# Patient Record
Sex: FEMALE | Race: WHITE | Employment: UNEMPLOYED | URBAN - METROPOLITAN AREA
[De-identification: names, ages, dates, MRNs, and addresses within clinical notes are randomized per-mention and may not be internally consistent; named-entity substitution may affect disease eponyms.]

---

## 2017-01-21 PROBLEM — O26.899 ABDOMINAL PAIN AFFECTING PREGNANCY: Status: ACTIVE | Noted: 2017-01-21

## 2017-01-21 PROBLEM — Z34.90 PREGNANT: Status: ACTIVE | Noted: 2017-01-21

## 2017-01-21 PROBLEM — R10.9 ABDOMINAL PAIN AFFECTING PREGNANCY: Status: ACTIVE | Noted: 2017-01-21

## 2017-07-10 ENCOUNTER — HOSPITAL ENCOUNTER (OUTPATIENT)
Dept: PHYSICAL THERAPY | Age: 37
Discharge: HOME | End: 2017-07-10
Payer: COMMERCIAL

## 2017-07-10 PROCEDURE — 97110 THERAPEUTIC EXERCISES: CPT | Performed by: PHYSICAL MEDICINE & REHABILITATION

## 2017-07-10 PROCEDURE — 97530 THERAPEUTIC ACTIVITIES: CPT | Performed by: PHYSICAL MEDICINE & REHABILITATION

## 2017-07-10 PROCEDURE — 97162 PT EVAL MOD COMPLEX 30 MIN: CPT | Performed by: PHYSICAL MEDICINE & REHABILITATION

## 2017-07-10 PROCEDURE — 97140 MANUAL THERAPY 1/> REGIONS: CPT | Performed by: PHYSICAL MEDICINE & REHABILITATION

## 2017-07-10 NOTE — PROGRESS NOTES
PT INITIAL EVALUATION NOTE 2-15    Patient Name: Atiya San  Date:7/10/2017  : 1980  [x]  Patient  Verified  Payor: Gera Aaron / Plan: [employer] Leach HMO / Product Type: HMO /    In time: 10:30  Out time:11:30  Total Treatment Time (min): 60  Visit #: 1     Treatment Area: Right hip pain [M25.551]  Left hip pain [M25.552]    SUBJECTIVE  Pain Level (0-10 scale): 0/10   Any medication changes, allergies to medications, adverse drug reactions, diagnosis change, or new procedure performed?: [] No    [x] Yes (see summary sheet for update)  Subjective:     Patient is a 30year old female with complaints of coccyx pain that started in her 6th month of pregnancy, became more constant as pregnancy progressed. Initially she was intolerant of sitting cross legged. Pain has persisted 6 months post partum. Natural vaginal delivery in supine, with \"some tearing\". 2017    At her 6 week appointment she was still having some coccyx pain which has improved but not fully resolved. Agg factor:   Getting in/out of a car  Prolonged driving - more than 30 minutes  Unable to tolerate sitting on hard surface    She struggled with constipation during pregnancy and after. She has a bowel movement daily rated at a 2 to a 4 on the [facility] Stool Chart. She feels that she does have to strain to have a bowel movement. She has strong urgency with a full bladder  She leaks during exercise. She estimates she is urinating 5-6 during the day, she wakes 0-1 x per night  She is having pain with intercourse, avoidant   Stopped nursing 1 month ago    PLOF: no tailbone pain  Mechanism of Injury: insidious at 6 months pregnancy  Previous Treatment/Compliance: none  PMHx/Surgical Hx: breast reduction 10 years ago.   Work Hx: SAHM  Living Situation: lives with spouse and baby  Pt Goals: no pain with ADLs  Barriers: none  Motivation: good  Substance use: none reported  FABQ Score:   Cognition: A & O x 4 OBJECTIVE/EXAMINATION    Posture:  Reduced lumbar lordosis in standing, R IC higher in standing  Other Observations: visible difficulty sitting level on treatment table- shifted to R/L hip through interview   Gait and Functional Mobility: no gait compensation   Palpation: Tender at sacrococcygeal joint, tender L coccygeus, a-p pressure to coccyx reproduces cc pain. Joint Mobility: SIJ clearing tests (-)   Coccyx flexed, stiff. In supine L post rotated innominate, L LE short, L pub rami elevated         Lumbar AROM:        R  L  Flexion    Touches ankles, pain at sc joint at endrange           Extension   Stiff 50% ROM, no pain           Side Bending   Full           Rotation   Full              Aberrant movements:  Painful arc: [] Yes  [x] No  Instability catch: [] Yes  [x] No  Difficulty returning from flexion: [] Yes  [x] No  Reversal of lumbopelvic rhythm: [] Yes  [x] No      LOWER QUARTER   MUSCLE STRENGTH  KEY       R  L  0 - No Contraction  L1, L2 Psoas  5/5  5/5     1 - Trace   L3 Quads  5/5  5/5      2 - Poor   L4 Tib Ant  5/5  5/5      3 - Fair    L5 EHL  5/5  5/5      4 - Good   S1 FHL  5/5  5/5      5 - Normal   S2 Hams  5/5  5/5              MMT:  Core strength: 4/5   Hip abduction: 4/5   Hip extension: 4/5    Neurological: Sensation: Denies parasthesias, focal weakness    Special Tests:        Slump: NT   Jp: Tight B Hipflexors        Tangela: Tight B ITBs     SLR: mild cc Pain endrange passive SLR bilaterally   TERI: clear bilaterally       Long Sit: NT     SI compression/ Distraction: Clear   Active SLR: Clear    Rectal/Coccyx Exam:    Able to relax EAS for exam   AP pressure to coccyx reproduced cc pain   Coccyx flexed, fixed, stiff.     Attempted external traction/ext technique, unable to mobilize coccyx   Attempted internal AP grade 3 SC mobs into ext, unable to mobilize coccyx           15 min Therapeutic Exercise:  [x] See flow sheet :   Rationale: increase ROM, increase strength, improve coordination and increase proprioception to improve the patients ability to decrease coccyx pain with sitting    15 min Therapeutic Activity:  []  See flow sheet : coccyx protection techniques, bowel habit instruction   Rationale: improve coordination and decrease strain on pelvic floor muscles  to improve the patients ability to decrease coccyx pain with sitting    15 min Manual Therapy:  Coccyx mobs - external and internal   Rationale: decrease pain, increase ROM, increase tissue extensibility and decrease trigger points  to improve the patients ability to decrease coccyx pain with sitting    With   [x] TE   [] TA   [] neuro   [] other: Patient Education: [x] Review HEP    [] Progressed/Changed HEP based on:   [] positioning   [] body mechanics   [] transfers   [] heat/ice application    [x] other: Bowel habit information. Other Objective/Functional Measures: Tolerated internal mobs well however unable to move coccyx, no pain relief. Pain Level (0-10 scale) post treatment: 2/10      ASSESSMENT:   Patient presents with signs and symptoms consistent with sacrococcygeal joint dysfunction, coccyx area pain. Attempted external coccyx mobilization technique with no improvement. Attempted internal rectal coccyx mobilization technique with no improvement in coccyx mobility or decrease in pain, tolerated attempt well. We did not do a pelvic floor exam today however we discussed the indication for this and will proceed next visit as she tolerates. I suspect levator ani and coccygeus dysfunction with stiff SC joint exaccerbated in pregnancy. She was provided a HEP of stretches today, tolerated well. She will benefit from skilled PT services to address her limitations and achieve her functional goals as stated on the plan of care.        [x]  See Plan of Eliza Coffey, PT, MSPT    7/10/2017  10:30 AM

## 2017-07-12 NOTE — PROGRESS NOTES
[address] [place] [address] Alek Greenfield  Phone: [phone]  Fax: [phone]    Plan of Care/ Statement of Necessity for Physical Therapy Services 2-15    Patient name: Evelio Parker  : 1980  Provider#: [id number]  Referral source: Addison Russo MD      Medical/Treatment Diagnosis: Right hip pain [M25.551]  Left hip pain [M25.552]     Prior Hospitalization: see medical history     Comorbidities: postpartum 17, breast reduction surgery 10 years ago  Prior Level of Function: no coccyx pain   Medications: Verified on Patient Summary List    Start of Care: 7/10/17      Onset Date: 1 year ago       The Plan of Care and following information is based on the information from the initial evaluation. Assessment/ key information:   Patient is a 30year old female with complaints of coccyx pain that started in her 6th month of pregnancy, became more constant as pregnancy progressed. Initially she was intolerant of sitting cross legged. Pain has persisted 6 months post partum. Patient presents with signs and symptoms consistent with sacrococcygeal joint dysfunction, coccyx area pain. Attempted external coccyx mobilization technique with no improvement. Attempted internal rectal coccyx mobilization technique with no improvement in coccyx mobility or decrease in pain, tolerated attempt well. We did not do a pelvic floor exam today however we discussed the indication for this and will proceed next visit as she tolerates. I suspect levator ani and coccygeus dysfunction with stiff SC joint exaccerbated in pregnancy. She was provided a HEP of stretches today, tolerated well. She will benefit from skilled PT services to address her limitations and achieve her functional goals as stated on the plan of care.        Evaluation Complexity History MEDIUM  Complexity : 1-2 comorbidities / personal factors will impact the outcome/ POC ; Examination MEDIUM Complexity : 3 Standardized tests and measures addressing body structure, function, activity limitation and / or participation in recreation  ;Presentation MEDIUM Complexity : Evolving with changing characteristics  ; Clinical Decision Making MEDIUM Complexity : FOTO score of 26-74  Overall Complexity Rating: MEDIUM    Problem List: pain affecting function, decrease ROM, decrease strength, decrease ADL/ functional abilitiies, decrease activity tolerance, decrease flexibility/ joint mobility and decrease transfer abilities   Treatment Plan may include any combination of the following: Therapeutic exercise, Therapeutic activities, Neuromuscular re-education, Physical agent/modality, Gait/balance training, Manual therapy, Patient education, Self Care training and Functional mobility training  Patient / Family readiness to learn indicated by: asking questions  Persons(s) to be included in education: patient (P)  Barriers to Learning/Limitations: None  Patient Goal (s): no pain with sitting  Patient Self Reported Health Status: excellent  Rehabilitation Potential: excellent    Short Term Goals: To be accomplished in 6  weeks:  Patient will be independent with a progressive home exercise program  Patient will be independent with posture and body mechanics principles to reduce coccyx pain  Patient will be independent with bowel habit principles to reduce strain on pelvic floor musculature  Patient will have no tenderness to palpation to coccyx area    Long Term Goals: To be accomplished in 12 weeks:  Patient will have no pain sitting with support  Patient will demonstrate good pelvic floor ms strength at 4/5   Patient will have no pelvic floor ms trigger points   Patient will return to intercourse with no pain . Frequency / Duration: Patient to be seen 1-2 times per week for up to 12 weeks.     Patient/ Caregiver education and instruction: self care, activity modification and exercises    [x]  Plan of care has been reviewed with PTA Angelia Osgood, PT, MSPT     7/12/2017 2:04 PM    ________________________________________________________________________    I certify that the above Therapy Services are being furnished while the patient is under my care. I agree with the treatment plan and certify that this therapy is necessary.     [de-identified] Signature:____________________  Date:____________Time: _________

## 2017-07-17 ENCOUNTER — HOSPITAL ENCOUNTER (OUTPATIENT)
Dept: PHYSICAL THERAPY | Age: 37
Discharge: HOME | End: 2017-07-17
Payer: COMMERCIAL

## 2017-07-17 PROCEDURE — 97110 THERAPEUTIC EXERCISES: CPT | Performed by: PHYSICAL MEDICINE & REHABILITATION

## 2017-07-17 PROCEDURE — 97140 MANUAL THERAPY 1/> REGIONS: CPT | Performed by: PHYSICAL MEDICINE & REHABILITATION

## 2017-07-17 NOTE — PROGRESS NOTES
PT DAILY TREATMENT NOTE 2-15    Patient Name: Tesfaye Estrada  Date:2017  : 1980  [x]  Patient  Verified  Payor: Chapito Hilario / Plan: Danny Hou HMO / Product Type: HMO /    In time: 11:00  Out time: 12:00  Total Treatment Time (min): 60  Visit #: 2    Treatment Area: Right hip pain [M25.551]  Left hip pain [M25.552]    SUBJECTIVE  Pain Level (0-10 scale): Any medication changes, allergies to medications, adverse drug reactions, diagnosis change, or new procedure performed?: [x] No    [] Yes (see summary sheet for update)  Subjective functional status/changes:   [] No changes reported  No change in her coccyx pain. She was poorly tolerant of laying on her back to do her HEP, did not do much of her prescribed exercise due to this. In general having a lot of thoracic and lumbar stiffness 'I need to pop\"    OBJECTIVE    Elevated L pub rami in supine  Post L innominate in supine, R LE long  Tender through mid thoracic paraspinals L > R.   Stiff with PA pressures L T 5-7     45 min Therapeutic Exercise:  [] See flow sheet :   Rationale: increase ROM, increase strength, improve coordination and increase proprioception to improve the patients ability to perform ADLs with no increased coccyx pain    15 min Manual Therapy:  MET pub rami correction, MET L post innominate correction. Rib spring in prone. MET SIJ   Rationale: decrease pain, increase ROM, increase tissue extensibility and increase postural awareness  to improve the patients ability to perform ADLs and HEP with no increased pain     With   [x] TE   [] TA   [] neuro   [] other: Patient Education: [x] Review HEP    [] Progressed/Changed HEP based on:   [] positioning   [] body mechanics   [] transfers   [] heat/ice application    [x] other: Updated HEP in chart. Other Objective/Functional Measures: Improved thoracic mobility and pelvic alignment post treatment.        Pain Level (0-10 scale) post treatment: 0/10    ASSESSMENT/Changes in Function:  Not acutely painful today. She has not been doing HEP due to intolerance of supine positioning. Demonstrated good supine tolerance post-treatment today. Advised pt to continue HEP over the next 3 weeks while on vacation. If no improvement in coccyx pain will revisit pelvic floor musculature with a manual approach. Patient will continue to benefit from skilled PT services to modify and progress therapeutic interventions, address functional mobility deficits, address ROM deficits, address strength deficits, analyze and address soft tissue restrictions, analyze and cue movement patterns, analyze and modify body mechanics/ergonomics and assess and modify postural abnormalities to attain remaining goals. [x]  See Plan of Care  []  See progress note/recertification  []  See Discharge Summary         Progress towards goals / Updated goals:  Able to advance exercises today with good tolerance. Pt continues to need instruction for correct exercise form and performance. Continues to demonstrate good potential to achieve functional goals stated on the plan of care.       PLAN  [x]  Upgrade activities as tolerated     []  Continue plan of care  []  Update interventions per flow sheet       []  Discharge due to:_  []  Other:_      Jeremy Carlton PT, MSPT  7/17/2017  11:04 AM

## 2017-08-14 ENCOUNTER — APPOINTMENT (OUTPATIENT)
Dept: PHYSICAL THERAPY | Age: 37
End: 2017-08-14
Payer: COMMERCIAL

## 2017-08-16 ENCOUNTER — APPOINTMENT (OUTPATIENT)
Dept: PHYSICAL THERAPY | Age: 37
End: 2017-08-16
Payer: COMMERCIAL

## 2017-08-17 ENCOUNTER — HOSPITAL ENCOUNTER (OUTPATIENT)
Dept: PHYSICAL THERAPY | Age: 37
Discharge: HOME | End: 2017-08-17
Payer: COMMERCIAL

## 2017-08-17 PROCEDURE — 97530 THERAPEUTIC ACTIVITIES: CPT | Performed by: PHYSICAL MEDICINE & REHABILITATION

## 2017-08-17 PROCEDURE — 97140 MANUAL THERAPY 1/> REGIONS: CPT | Performed by: PHYSICAL MEDICINE & REHABILITATION

## 2017-08-17 PROCEDURE — 97110 THERAPEUTIC EXERCISES: CPT | Performed by: PHYSICAL MEDICINE & REHABILITATION

## 2017-08-17 NOTE — PROGRESS NOTES
PT DAILY TREATMENT NOTE 2-15    Patient Name: Efrain Blackwell  Date:2017  : 1980  [x]  Patient  Verified  Payor: Lenore Manzo / Plan: Damari Mortensen HMO / Product Type: HMO /    In time: 2:30   Out time: 3:30   Total Treatment Time (min): 60  Visit #: 3     Treatment Area: Right hip pain [M25.551]  Left hip pain [M25.552]    SUBJECTIVE  Pain Level (0-10 scale): 0/10  Any medication changes, allergies to medications, adverse drug reactions, diagnosis change, or new procedure performed?: [x] No    [] Yes (see summary sheet for update)  Subjective functional status/changes:   [] No changes reported  She fell down the stairs 17 landing directly on her bottom, pain worsened significantly since then and has persisted. She is less tolerant of sitting than previous. OBJECTIVE    15 min Therapeutic Exercise:  [x] See flow sheet :  HEP review   Rationale: increase ROM, increase strength, improve coordination and increase proprioception to improve the patients ability to increase sitting tolerance. 15 min Therapeutic Activity:  [x]  See flow sheet : Coccyx relief techniques, sitting postures, coccyx pillow   Rationale: increase ROM, increase strength and decrease pain   to improve the patients ability to increase sitting tolerance    30 min Manual Therapy:  Sacral pressure, STM to sacral ligaments. Rationale: increase ROM, increase tissue extensibility and decrease trigger points  to improve the patients ability to increase sitting tolerance          With   [x] TE   [x] TA   [] neuro   [] other: Patient Education: [x] Review HEP    [] Progressed/Changed HEP based on:   [] positioning   [] body mechanics   [] transfers   [] heat/ice application    [] other:      Other Objective/Functional Measures: Tender at [facility] joint     Pain Level (0-10 scale) post treatment: 3/10    ASSESSMENT/Changes in Function:   Increase in coxxyx pain after recent fall.      Patient will continue to benefit from skilled PT services to modify and progress therapeutic interventions, address functional mobility deficits, address ROM deficits, address strength deficits, analyze and address soft tissue restrictions, analyze and cue movement patterns, analyze and modify body mechanics/ergonomics and assess and modify postural abnormalities to attain remaining goals. [x]  See Plan of Care  []  See progress note/recertification  []  See Discharge Summary         Progress towards goals / Updated goals:  Able to advance exercises today with good tolerance. Pt continues to need instruction for correct exercise form and performance. Continues to demonstrate good potential to achieve functional goals stated on the plan of care.       PLAN  [x]  Upgrade activities as tolerated     []  Continue plan of care  []  Update interventions per flow sheet       []  Discharge due to:_  []  Other:_      Gaylin Sacks, PT, MSPT 8/17/2017  2:33 PM

## 2017-09-07 ENCOUNTER — APPOINTMENT (OUTPATIENT)
Dept: PHYSICAL THERAPY | Age: 37
End: 2017-09-07

## 2017-09-13 ENCOUNTER — APPOINTMENT (OUTPATIENT)
Dept: PHYSICAL THERAPY | Age: 37
End: 2017-09-13

## 2017-09-27 LAB
CHLAMYDIA, EXTERNAL: NEGATIVE
HBSAG, EXTERNAL: NEGATIVE
HIV, EXTERNAL: NON REACTIVE
N. GONORRHEA, EXTERNAL: NEGATIVE
RUBELLA, EXTERNAL: NORMAL

## 2017-10-25 NOTE — PROGRESS NOTES
[address] [facility] [place] 59  Phone: [phone]  Fax: [phone]    Discharge Summary  2-15    Patient name: Sherita Kurtz  : 1980  Provider#: [id number]  Referral source: Manuel Naqvi MD      Medical/Treatment Diagnosis: Right hip pain [M25.551]  Left hip pain [M25.552]     Prior Hospitalization: see medical history     Comorbidities: See Plan of Care  Prior Level of Function:See Plan of Care  Medications: Verified on Patient Summary List    Start of Care: 7/10/17      Onset Date: during pregnancy    Visits from Start of Care: 3     Missed Visits: 1  Reporting Period : 7/10/17 to 10/25/17      ASSESSMENT/SUMMARY OF CARE:  Judy Alexander was referred for evaluation and treatment of coccyx pain and dyspareunia post-partum. She was seen for 3 visits. Treatment consisted of manual therapy, therapeutic exercise, therapeutic activity, bowel habit education, home exercise program development and progression. Initially Mrs Fatuma Daily demonstrated a good response to PT, especially stretches and manual therapy. Unfortunately she suffered a fall which exacerbated her symptoms 17. She has not been seen after her third visit, attempts to contact the patient have been unsuccessful. She is discharged with current functional status unknown. Short Term Goals: To be accomplished in 6  weeks:  Patient will be independent with a progressive home exercise program   MET  Patient will be independent with posture and body mechanics principles to reduce coccyx pain MET  Patient will be independent with bowel habit principles to reduce strain on pelvic floor musculature MET  Patient will have no tenderness to palpation to coccyx area     Long Term Goals:  To be accomplished in 12 weeks:  Patient will have no pain sitting with support  Patient will demonstrate good pelvic floor ms strength at 4/5   Patient will have no pelvic floor ms trigger points Patient will return to intercourse with no pain .      RECOMMENDATIONS:  [x]Discontinue therapy: []Patient has reached or is progressing toward set goals      [x]Patient is non-compliant or has abdicated      []Due to lack of appreciable progress towards set goals      []Other    Gloria Miller PT, MSPT    10/25/2017 10:12 AM

## 2018-02-16 LAB
ANTIBODY SCREEN, EXTERNAL: NEGATIVE
T. PALLIDUM, EXTERNAL: NEGATIVE

## 2018-04-13 LAB — GRBS, EXTERNAL: POSITIVE

## 2018-05-06 ENCOUNTER — HOSPITAL ENCOUNTER (INPATIENT)
Age: 38
LOS: 2 days | Discharge: HOME | End: 2018-05-08
Attending: OBSTETRICS & GYNECOLOGY | Admitting: OBSTETRICS & GYNECOLOGY
Payer: COMMERCIAL

## 2018-05-06 LAB
BASOPHILS # BLD: 0 K/UL (ref 0–0.1)
BASOPHILS NFR BLD: 0 % (ref 0–1)
DIFFERENTIAL METHOD BLD: ABNORMAL
EOSINOPHIL # BLD: 0.1 K/UL (ref 0–0.4)
EOSINOPHIL NFR BLD: 0 % (ref 0–7)
ERYTHROCYTE [DISTWIDTH] IN BLOOD BY AUTOMATED COUNT: 14.7 % (ref 11.5–14.5)
HCT VFR BLD AUTO: 33.5 % (ref 35–47)
HGB BLD-MCNC: 11.3 G/DL (ref 11.5–16)
IMM GRANULOCYTES # BLD: 0.1 K/UL (ref 0–0.04)
IMM GRANULOCYTES NFR BLD AUTO: 1 % (ref 0–0.5)
LYMPHOCYTES # BLD: 2.2 K/UL (ref 0.8–3.5)
LYMPHOCYTES NFR BLD: 19 % (ref 12–49)
MCH RBC QN AUTO: 28.7 PG (ref 26–34)
MCHC RBC AUTO-ENTMCNC: 33.7 G/DL (ref 30–36.5)
MCV RBC AUTO: 85 FL (ref 80–99)
MONOCYTES # BLD: 0.8 K/UL (ref 0–1)
MONOCYTES NFR BLD: 7 % (ref 5–13)
NEUTS SEG # BLD: 8.4 K/UL (ref 1.8–8)
NEUTS SEG NFR BLD: 73 % (ref 32–75)
NRBC # BLD: 0 K/UL (ref 0–0.01)
NRBC BLD-RTO: 0 PER 100 WBC
PLATELET # BLD AUTO: 236 K/UL (ref 150–400)
PMV BLD AUTO: 10.3 FL (ref 8.9–12.9)
RBC # BLD AUTO: 3.94 M/UL (ref 3.8–5.2)
WBC # BLD AUTO: 11.6 K/UL (ref 3.6–11)

## 2018-05-06 PROCEDURE — 99283 EMERGENCY DEPT VISIT LOW MDM: CPT

## 2018-05-06 PROCEDURE — 74011250637 HC RX REV CODE- 250/637

## 2018-05-06 PROCEDURE — 85025 COMPLETE CBC W/AUTO DIFF WBC: CPT | Performed by: OBSTETRICS & GYNECOLOGY

## 2018-05-06 PROCEDURE — 75410000003 HC RECOV DEL/VAG/CSECN EA 0.5 HR: Performed by: OBSTETRICS & GYNECOLOGY

## 2018-05-06 PROCEDURE — 0HQ9XZZ REPAIR PERINEUM SKIN, EXTERNAL APPROACH: ICD-10-PCS | Performed by: OBSTETRICS & GYNECOLOGY

## 2018-05-06 PROCEDURE — 77030034850

## 2018-05-06 PROCEDURE — 75410000002 HC LABOR FEE PER 1 HR: Performed by: OBSTETRICS & GYNECOLOGY

## 2018-05-06 PROCEDURE — 65270000029 HC RM PRIVATE

## 2018-05-06 PROCEDURE — 74011250636 HC RX REV CODE- 250/636

## 2018-05-06 PROCEDURE — 74011000250 HC RX REV CODE- 250

## 2018-05-06 PROCEDURE — 75410000000 HC DELIVERY VAGINAL/SINGLE: Performed by: OBSTETRICS & GYNECOLOGY

## 2018-05-06 PROCEDURE — 4A1H74Z MONITORING OF PRODUCTS OF CONCEPTION, CARDIAC ELECTRICAL ACTIVITY, VIA NATURAL OR ARTIFICIAL OPENING: ICD-10-PCS | Performed by: OBSTETRICS & GYNECOLOGY

## 2018-05-06 PROCEDURE — 77030011943

## 2018-05-06 PROCEDURE — 74011250637 HC RX REV CODE- 250/637: Performed by: OBSTETRICS & GYNECOLOGY

## 2018-05-06 PROCEDURE — 77030021125

## 2018-05-06 PROCEDURE — 74011250636 HC RX REV CODE- 250/636: Performed by: OBSTETRICS & GYNECOLOGY

## 2018-05-06 PROCEDURE — 59025 FETAL NON-STRESS TEST: CPT

## 2018-05-06 PROCEDURE — 36415 COLL VENOUS BLD VENIPUNCTURE: CPT | Performed by: OBSTETRICS & GYNECOLOGY

## 2018-05-06 RX ORDER — HYDROMORPHONE HYDROCHLORIDE 2 MG/ML
1 INJECTION, SOLUTION INTRAMUSCULAR; INTRAVENOUS; SUBCUTANEOUS
Status: COMPLETED | OUTPATIENT
Start: 2018-05-06 | End: 2018-05-06

## 2018-05-06 RX ORDER — OXYTOCIN IN 5 % DEXTROSE 30/500 ML
.5-2 PLASTIC BAG, INJECTION (ML) INTRAVENOUS
Status: DISCONTINUED | OUTPATIENT
Start: 2018-05-06 | End: 2018-05-06

## 2018-05-06 RX ORDER — MISOPROSTOL 200 UG/1
TABLET ORAL
Status: COMPLETED
Start: 2018-05-06 | End: 2018-05-06

## 2018-05-06 RX ORDER — CEFAZOLIN SODIUM/WATER 2 G/20 ML
2 SYRINGE (ML) INTRAVENOUS
Status: COMPLETED | OUTPATIENT
Start: 2018-05-06 | End: 2018-05-06

## 2018-05-06 RX ORDER — NALOXONE HYDROCHLORIDE 0.4 MG/ML
0.4 INJECTION, SOLUTION INTRAMUSCULAR; INTRAVENOUS; SUBCUTANEOUS AS NEEDED
Status: DISCONTINUED | OUTPATIENT
Start: 2018-05-06 | End: 2018-05-08 | Stop reason: HOSPADM

## 2018-05-06 RX ORDER — SWAB
1 SWAB, NON-MEDICATED MISCELLANEOUS DAILY
Status: DISCONTINUED | OUTPATIENT
Start: 2018-05-06 | End: 2018-05-08 | Stop reason: HOSPADM

## 2018-05-06 RX ORDER — METHYLERGONOVINE MALEATE 0.2 MG/ML
INJECTION INTRAVENOUS
Status: COMPLETED
Start: 2018-05-06 | End: 2018-05-06

## 2018-05-06 RX ORDER — LIDOCAINE HYDROCHLORIDE 10 MG/ML
INJECTION, SOLUTION EPIDURAL; INFILTRATION; INTRACAUDAL; PERINEURAL
Status: COMPLETED
Start: 2018-05-06 | End: 2018-05-06

## 2018-05-06 RX ORDER — IBUPROFEN 800 MG/1
800 TABLET ORAL EVERY 8 HOURS
Status: DISCONTINUED | OUTPATIENT
Start: 2018-05-06 | End: 2018-05-08 | Stop reason: HOSPADM

## 2018-05-06 RX ORDER — SODIUM CHLORIDE 0.9 % (FLUSH) 0.9 %
5-10 SYRINGE (ML) INJECTION EVERY 8 HOURS
Status: DISCONTINUED | OUTPATIENT
Start: 2018-05-06 | End: 2018-05-06

## 2018-05-06 RX ORDER — SODIUM CHLORIDE, SODIUM LACTATE, POTASSIUM CHLORIDE, CALCIUM CHLORIDE 600; 310; 30; 20 MG/100ML; MG/100ML; MG/100ML; MG/100ML
125 INJECTION, SOLUTION INTRAVENOUS CONTINUOUS
Status: DISCONTINUED | OUTPATIENT
Start: 2018-05-06 | End: 2018-05-06

## 2018-05-06 RX ORDER — METHYLERGONOVINE MALEATE 0.2 MG/ML
0.2 INJECTION INTRAVENOUS ONCE
Status: COMPLETED | OUTPATIENT
Start: 2018-05-06 | End: 2018-05-06

## 2018-05-06 RX ORDER — HYDROCORTISONE ACETATE PRAMOXINE HCL 2.5; 1 G/100G; G/100G
CREAM TOPICAL AS NEEDED
Status: DISCONTINUED | OUTPATIENT
Start: 2018-05-06 | End: 2018-05-08 | Stop reason: HOSPADM

## 2018-05-06 RX ORDER — MISOPROSTOL 200 UG/1
800 TABLET ORAL
Status: COMPLETED | OUTPATIENT
Start: 2018-05-06 | End: 2018-05-06

## 2018-05-06 RX ORDER — HYDROCODONE BITARTRATE AND ACETAMINOPHEN 7.5; 325 MG/1; MG/1
1 TABLET ORAL
Status: DISCONTINUED | OUTPATIENT
Start: 2018-05-06 | End: 2018-05-08 | Stop reason: HOSPADM

## 2018-05-06 RX ORDER — OXYTOCIN IN 5 % DEXTROSE 30/500 ML
PLASTIC BAG, INJECTION (ML) INTRAVENOUS
Status: COMPLETED
Start: 2018-05-06 | End: 2018-05-06

## 2018-05-06 RX ORDER — NALOXONE HYDROCHLORIDE 0.4 MG/ML
0.4 INJECTION, SOLUTION INTRAMUSCULAR; INTRAVENOUS; SUBCUTANEOUS AS NEEDED
Status: DISCONTINUED | OUTPATIENT
Start: 2018-05-06 | End: 2018-05-06

## 2018-05-06 RX ORDER — OXYTOCIN/RINGER'S LACTATE 20/1000 ML
125-500 PLASTIC BAG, INJECTION (ML) INTRAVENOUS ONCE
Status: COMPLETED | OUTPATIENT
Start: 2018-05-06 | End: 2018-05-06

## 2018-05-06 RX ORDER — SODIUM CHLORIDE 0.9 % (FLUSH) 0.9 %
5-10 SYRINGE (ML) INJECTION AS NEEDED
Status: DISCONTINUED | OUTPATIENT
Start: 2018-05-06 | End: 2018-05-06

## 2018-05-06 RX ORDER — ONDANSETRON 2 MG/ML
4 INJECTION INTRAMUSCULAR; INTRAVENOUS
Status: DISCONTINUED | OUTPATIENT
Start: 2018-05-06 | End: 2018-05-08 | Stop reason: HOSPADM

## 2018-05-06 RX ADMIN — IBUPROFEN 800 MG: 800 TABLET ORAL at 08:22

## 2018-05-06 RX ADMIN — LIDOCAINE HYDROCHLORIDE 5 ML: 10 INJECTION, SOLUTION EPIDURAL; INFILTRATION; INTRACAUDAL; PERINEURAL at 01:01

## 2018-05-06 RX ADMIN — Medication 5000 MILLI-UNITS/HR: at 03:35

## 2018-05-06 RX ADMIN — ONDANSETRON 4 MG: 2 INJECTION INTRAMUSCULAR; INTRAVENOUS at 03:18

## 2018-05-06 RX ADMIN — Medication 10000 MILLI-UNITS/HR: at 03:20

## 2018-05-06 RX ADMIN — METHYLERGONOVINE MALEATE 0.2 MG: 0.2 INJECTION, SOLUTION INTRAMUSCULAR; INTRAVENOUS at 02:30

## 2018-05-06 RX ADMIN — SODIUM CHLORIDE, SODIUM LACTATE, POTASSIUM CHLORIDE, AND CALCIUM CHLORIDE 1000 ML: 600; 310; 30; 20 INJECTION, SOLUTION INTRAVENOUS at 00:39

## 2018-05-06 RX ADMIN — Medication 10 ML: at 02:56

## 2018-05-06 RX ADMIN — Medication 999 MILLI-UNITS/MIN: at 02:28

## 2018-05-06 RX ADMIN — Medication 999 MILLI-UNITS/MIN: at 01:00

## 2018-05-06 RX ADMIN — MISOPROSTOL 800 MCG: 200 TABLET ORAL at 02:50

## 2018-05-06 RX ADMIN — IBUPROFEN 800 MG: 800 TABLET ORAL at 16:20

## 2018-05-06 RX ADMIN — HYDROMORPHONE HYDROCHLORIDE 1 MG: 2 INJECTION INTRAMUSCULAR; INTRAVENOUS; SUBCUTANEOUS at 02:49

## 2018-05-06 RX ADMIN — MUPIROCIN: 20 OINTMENT TOPICAL at 08:22

## 2018-05-06 RX ADMIN — Medication 1 TABLET: at 08:22

## 2018-05-06 RX ADMIN — Medication 2 G: at 02:52

## 2018-05-06 RX ADMIN — Medication 19980 MILLI-UNITS/HR: at 02:32

## 2018-05-06 NOTE — PROGRESS NOTES
Patient: Laura Abraham  MRN: [id number]   Delivery Note    Obstetrician:  Thad Decker MD    Pre-Delivery Diagnosis:   <principal problem not specified>    Delivery:          Amniotic Fluid Volume : Moderate thin meconium     Delivery Complications:  None    Infant Details:  Information for the patient's :  [place], Male [[id number]]   One Minute Apgar: 6 (Filed from Delivery Summary)  Five  Minute Apgar: 9 (Filed from Delivery Summary)      No results found for: APH, APCO2, APO2, AHCO3, ABEC, ABDC, O2ST, SITE, RSCOM     Placenta:Spontaneous, intact        Episiotomy/Laceration(s):1st degree laceration            Episiotomy/Laceration(s) Repair: Yes, repaired with 2-0 Vicryl.     Group Beta Strep:   Lab Results   Component Value Date/Time    [place], External Negative 2016               Signed By:  Thad Decker MD     May 6, 2018

## 2018-05-06 NOTE — PROGRESS NOTES
SBAR IN Report: Mother    Verbal report received from Regino 274 (full name & credentials) on this patient, who is now being transferred from Labor and Delivery Unit (unit) for routine progression of care. The patient is not wearing a green \"Anesthesia-Duramorph\" band. Report consisted of patient's Situation, Background, Assessment and Recommendations (SBAR).  ID bands were compared with the identification form, and verified with the patient and transferring nurse. Information from the SBAR and [facility] and the [place] Report was reviewed with the transferring nurse; opportunity for questions and clarification provided.

## 2018-05-06 NOTE — IP AVS SNAPSHOT
[address] 1007 [facility] 
[phone] Patient: Bhupinder Lao MRN: [id number] BVT:[id number] About your hospitalization You were admitted on:  May 6, 2018 You last received care in the:  1305 [facility] 3 [facility] You were discharged on: May 8, 2018 Why you were hospitalized Your primary diagnosis was:  Not on File Your diagnoses also included:  Labor, Precipitous, Acute Superficial Venous Thrombosis Of Left Lower Extremity Follow-up Information Follow up With Details Comments Contact Info MD [address] 1007 [facility] 
[phone] Discharge Orders None A check dara indicates which time of day the medication should be taken. My Medications START taking these medications Instructions Each Dose to Equal  
 Morning Noon Evening Bedtime HYDROcodone-acetaminophen 7.5-325 mg per tablet Commonly known as:  [address] Your last dose was: Your next dose is: Take 1 Tab by mouth every six (6) hours as needed. Max Daily Amount: 4 Tabs. 1 Tab CONTINUE taking these medications Instructions Each Dose to Equal  
 Morning Noon Evening Bedtime CLARITIN 10 mg tablet Generic drug:  loratadine Your last dose was: Your next dose is: Take 10 mg by mouth. 10 mg  
    
   
   
   
  
 ibuprofen 800 mg tablet Commonly known as:  MOTRIN Your last dose was: Your next dose is: Take 1 Tab by mouth every eight (8) hours as needed for Pain. 800 mg  
    
   
   
   
  
 prenatal multivit-ca-min-fe-fa Tab Your last dose was: Your next dose is: Take  by mouth daily. STOP taking these medications   
 clindamycin 150 mg capsule Commonly known as:  CLEOCIN  
   
  
 MAGNESIUM PO  
   
  
 oxyCODONE-acetaminophen 5-325 mg per tablet Commonly known as:  PERCOCET  
   
  
 ROXICODONE 5 mg immediate release tablet Generic drug:  oxyCODONE IR  
   
  
 ZINC PO Where to Get Your Medications Information on where to get these meds will be given to you by the nurse or doctor. ! Ask your nurse or doctor about these medications HYDROcodone-acetaminophen 7.5-325 mg per tablet  
 ibuprofen 800 mg tablet Opioid Education Prescription Opioids: What You Need to Know: 
 
Prescription opioids can be used to help relieve moderate-to-severe pain and are often prescribed following a surgery or injury, or for certain health conditions. These medications can be an important part of treatment but also come with serious risks. Opioids are strong pain medicines. Examples include hydrocodone, oxycodone, fentanyl, and morphine. Heroin is an example of an illegal opioid. It is important to work with your health care provider to make sure you are getting the safest, most effective care. WHAT ARE THE RISKS AND SIDE EFFECTS OF OPIOID USE? Prescription opioids carry serious risks of addiction and overdose, especially with prolonged use. An opioid overdose, often marked by slow breathing, can cause sudden death. The use of prescription opioids can have a number of side effects as well, even when taken as directed. · Tolerance-meaning you might need to take more of a medication for the same pain relief · Physical dependence-meaning you have symptoms of withdrawal when the medication is stopped. Withdrawal symptoms can include nausea, sweating, chills, diarrhea, stomach cramps, and muscle aches. Withdrawal can last up to several weeks, depending on which drug you took and how long you took it. · Increased sensitivity to pain · Constipation · Nausea, vomiting, and dry mouth · Sleepiness and dizziness · Confusion · Depression · Low levels of testosterone that can result in lower sex drive, energy, and strength · Itching and sweating RISKS ARE GREATER WITH:      
· History of drug misuse, substance use disorder, or overdose · Mental health conditions (such as depression or anxiety) · Sleep apnea · Older age (72 years or older) · Pregnancy Avoid alcohol while taking prescription opioids. Also, unless specifically advised by your health care provider, medications to avoid include: · Benzodiazepines (such as Xanax or Valium) · Muscle relaxants (such as Soma or Flexeril) · Hypnotics (such as Ambien or Lunesta) · Other prescription opioids KNOW YOUR OPTIONS Talk to your health care provider about ways to manage your pain that don't involve prescription opioids. Some of these options may actually work better and have fewer risks and side effects. Options may include: 
· Pain relievers such as acetaminophen, ibuprofen, and naproxen · Some medications that are also used for depression or seizures · Physical therapy and exercise · Counseling to help patients learn how to cope better with triggers of pain and stress. · Application of heat or cold compress · Massage therapy · Relaxation techniques Be Informed Make sure you know the name of your medication, how much and how often to take it, and its potential risks & side effects. IF YOU ARE PRESCRIBED OPIOIDS FOR PAIN: 
· Never take opioids in greater amounts or more often than prescribed. Remember the goal is not to be pain-free but to manage your pain at a tolerable level. · Follow up with your primary care provider to: · Work together to create a plan on how to manage your pain. · Talk about ways to help manage your pain that don't involve prescription opioids. · Talk about any and all concerns and side effects. · Help prevent misuse and abuse. · Never sell or share prescription opioids · Help prevent misuse and abuse. · Store prescription opioids in a secure place and out of reach of others (this may include visitors, children, friends, and family). · Safely dispose of unused/unwanted prescription opioids: Find your community drug take-back program or your pharmacy mail-back program, or flush them down the toilet, following guidance from the Food and Drug Administration (www.fda.gov/Drugs/ResourcesForYou). · Visit www.cdc.gov/drugoverdose to learn about the risks of opioid abuse and overdose. · If you believe you may be struggling with addiction, tell your health care provider and ask for guidance or call [employer] at 4-189-664-NLQB. Discharge Instructions Vaginal Childbirth: After Your Visit Your Care Instructions Your body will slowly heal in the next few weeks. It is easy to get too tired and overwhelmed during the first weeks after your baby is born. Changes in your hormones can shift your mood without warning. You may find it hard to meet the extra demands on your energy and time. Take it easy on yourself. Follow-up care is a key part of your treatment and safety. Be sure to make and go to all appointments, and call your doctor if you are having problems. It's also a good idea to know your test results and keep a list of the medicines you take. How can you care for yourself at home? · Vaginal bleeding and cramps ¨ After delivery, you will have a bloody discharge from the vagina. This will turn pink within a week and then white or yellow after about 10 days. It may last for 2 to 4 weeks or longer, until the uterus has healed. Use pads instead of tampons until you stop bleeding. ¨ Do not worry if you pass some blood clots, as long as they are smaller than a golf ball. If you have a tear or stitches in your vaginal area, change the pad at least every 4 hours to prevent soreness and infection. ¨ You may have cramps for the first few days after childbirth. These are normal and occur as the uterus shrinks to normal size. Take an over-the-counter pain medicine, such as acetaminophen (Tylenol), ibuprofen (Advil, Motrin), or naproxen (Aleve), for cramps. Read and follow all instructions on the label. Do not take aspirin, because it can cause more bleeding. ¨ Do not take two or more pain medicines at the same time unless the doctor told you to. Many pain medicines have acetaminophen, which is Tylenol. Too much acetaminophen (Tylenol) can be harmful. · Stitches ¨ If you have stitches, they will dissolve on their own and do not need to be removed. Follow your doctor's instructions for cleaning the stitched area. ¨ Put ice or a cold pack on your painful area for 10 to 20 minutes at a time, several times a day, for the first few days. Put a thin cloth between the ice and your skin. ¨ Sit in a few inches of warm water (sitz bath) 3 times a day and after bowel movements. The warm water helps with pain and itching. If you do not have a tub, a warm shower might help. · Breast fullness ¨ Your breasts may overfill (engorge) in the first few days after delivery. To help milk flow and to relieve pain, warm your breasts in the shower or by using warm, moist towels before nursing. ¨ If you are not nursing, do not put warmth on your breasts or touch your breasts. Wear a tight bra or sports bra and use ice until the fullness goes away. This usually takes 2 to 3 days. ¨ Put ice or a cold pack on your breast after nursing to reduce swelling and pain. Put a thin cloth between the ice and your skin. · Activity ¨ Eat a balanced diet. Do not try to lose weight by cutting calories. Keep taking your prenatal vitamins, or take a multivitamin. ¨ Get as much rest as you can. Try to take naps when your baby sleeps during the day. ¨ Get some exercise every day.  But do not do any heavy exercise until your doctor says it is okay. ¨ Wait until you are healed (about 4 to 6 weeks) before you have sexual intercourse. Your doctor will tell you when it is okay to have sex. ¨ Talk to your doctor about birth control. You can get pregnant even before your period returns. Also, you can get pregnant while you are breast-feeding. · Mental health ¨ It is normal to have some sadness, anxiety, sleeplessness, and mood swings after you go home. If you feel upset or hopeless for more than a few days or are having trouble doing the things you need to do, talk to your doctor. · Constipation and hemorrhoids ¨ Drink plenty of fluids, enough so that your urine is light yellow or clear like water. If you have kidney, heart, or liver disease and have to limit fluids, talk with your doctor before you increase the amount of fluids you drink. ¨ Eat plenty of fiber each day. Have a bran muffin or bran cereal for breakfast, and try eating a piece of fruit for a mid-afternoon snack. ¨ For painful, itchy hemorrhoids, put ice or a cold pack on the area several times a day for 10 minutes at a time. Follow this by putting a warm compress on the area for another 10 to 20 minutes or by sitting in a shallow, warm bath. When should you call for help? Call 911 anytime you think you may need emergency care. For example, call if: 
· You are thinking of hurting yourself, your baby, or anyone else. · You have sudden, severe pain in your belly. · You passed out (lost consciousness). Call your doctor now or seek immediate medical care if: 
· You have severe vaginal bleeding. · You are soaking through a pad each hour for 2 or more hours. · Your vaginal bleeding seems to be getting heavier or is still bright red 4 days after delivery. · You are dizzy or lightheaded, or you feel like you may faint. · You are vomiting or cannot keep fluids down. · You have a fever. · You have new or more belly pain. · You pass tissue (not just blood). · Your vaginal discharge smells bad. · Your belly feels tender or full and hard. · Your breasts are continuously painful or red. · You feel sad, anxious, or hopeless for more than a few days. Watch closely for changes in your health, and be sure to contact your doctor if you have any problems. Where can you learn more? Go to LogicBay.be Enter J424 in the search box to learn more about \"Vaginal Childbirth: After Your Visit. \"  
© 1863-4532 Healthwise, Incorporated. Care instructions adapted under license by [employer] (which disclaims liability or warranty for this information). This care instruction is for use with your licensed healthcare professional. If you have questions about a medical condition or this instruction, always ask your healthcare professional. [address] any warranty or liability for your use of this information. Content Version: 51.4.102231; Current as of: February 20, 2015 Augure Announcement We are excited to announce that we are making your provider's discharge notes available to you in Augure. You will see these notes when they are completed and signed by the physician that discharged you from your recent hospital stay. If you have any questions or concerns about any information you see in Augure, please call the Health Information Department where you were seen or reach out to your Primary Care Provider for more information about your plan of care. Introducing [facility]! [facility] [employer] [facility] introduces Augure patient portal. Now you can access parts of your medical record, email your doctor's office, and request medication refills online. 1. In your internet browser, go to [email or web address] 2. Click on the First Time User? Click Here link in the Sign In box. You will see the New Member Sign Up page. 3. Enter your Augure Access Code exactly as it appears below.  You will not need to use this code after youve completed the sign-up process. If you do not sign up before the expiration date, you must request a new code. · Nutzvieh24 Access Code: [email or web address] Expires: 7/3/2018 12:13 PM 
 
4. Enter the last four digits of your Social Security Number (xxxx) and Date of Birth (mm/dd/yyyy) as indicated and click Submit. You will be taken to the next sign-up page. 5. Create a Nutzvieh24 ID. This will be your Nutzvieh24 login ID and cannot be changed, so think of one that is secure and easy to remember. 6. Create a Nutzvieh24 password. You can change your password at any time. 7. Enter your Password Reset Question and Answer. This can be used at a later time if you forget your password. 8. Enter your e-mail address. You will receive e-mail notification when new information is available in [address] 9. Click Sign Up. You can now view and download portions of your medical record. 10. Click the Download Summary menu link to download a portable copy of your medical information. If you have questions, please visit the Frequently Asked Questions section of the Nutzvieh24 website. Remember, Nutzvieh24 is NOT to be used for urgent needs. For medical emergencies, dial 911. Now available from your iPhone and Android! Introducing Pro Linares As a [facility] patient, I wanted to make you aware of our electronic visit tool called Pro Linares. [facility] 24/7 allows you to connect within minutes with a medical provider 24 hours a day, seven days a week via a mobile device or tablet or logging into a secure website from your computer. You can access Pro Mathewsfin from anywhere in the [place].  
 
A virtual visit might be right for you when you have a simple condition and feel like you just dont want to get out of bed, or cant get away from work for an appointment, when your regular [facility] provider is not available (evenings, weekends or holidays), or when youre out of town and need minor care. Electronic visits cost only $49 and if the [facility] 24/[employer] provider determines a prescription is needed to treat your condition, one can be electronically transmitted to a nearby pharmacy*. Please take a moment to enroll today if you have not already done so. The enrollment process is free and takes just a few minutes. To enroll, please download the [employer] Infinity aidee to your tablet or phone, or visit [email or web address] to enroll on your computer. And, as an [address] [place] [address] patient with a ZEB account, the results of your visits will be scanned into your electronic medical record and your primary care provider will be able to view the scanned results. We urge you to continue to see your regular [facility] provider for your ongoing medical care. And while your primary care provider may not be the one available when you seek a Hotlist virtual visit, the peace of mind you get from getting a real diagnosis real time can be priceless. For more information on Hotlist, view our Frequently Asked Questions (FAQs) at [email or web address]. Sincerely, 
 
Marko Barrios MD 
Chief Medical Officer [facility] Caitlyn Rinaldi *:  certain medications cannot be prescribed via Hotlist Providers Seen During Your Hospitalization Provider Specialty Primary office phone Nicola Farr MD Obstetrics & Gynecology [phone] Your Primary Care Physician (PCP) Primary Care Physician Office Phone Office Fax Tony Bhatia [phone] You are allergic to the following Allergen Reactions Shellfish Derived Hives Shrimp only Recent Documentation Height Weight Breastfeeding? BMI OB Status Smoking Status 1.753 m 97.7 kg Unknown 31.81 kg/m2 Recent pregnancy Former Smoker Emergency Contacts Name Discharge Info Relation Home Work Mobile Levar Ro DISCHARGE CAREGIVER [3] Spouse [3]   [phone] GarlandJareda DISCHARGE CAREGIVER [3] Mother [14]   [phone] Patient Belongings The following personal items are in your possession at time of discharge: 
  Dental Appliances: None  Visual Aid: At bedside, Contacts, With patient      Home Medications: None   Jewelry: None  Clothing: At bedside, Shirt, With patient, Footwear, Undergarments, Pants, Socks    Other Valuables: At bedside, Cell Phone, Pedro Balm, Personal electronic devices (comment), Purse, Wallet, Personal toiletries, Contact Lenses, Suitcase, With patient  Personal Items Sent to Safe: none Please provide this summary of care documentation to your next provider. Signatures-by signing, you are acknowledging that this After Visit Summary has been reviewed with you and you have received a copy. Patient Signature:  ____________________________________________________________ Date:  ____________________________________________________________  
  
Valentine Donahue Provider Signature:  ____________________________________________________________ Date:  ____________________________________________________________

## 2018-05-06 NOTE — PROGRESS NOTES
0025: Pt arrives to L&D unit via wheelchair accompanied by FOB visibly in labor. Pt is admitted to room 206, pt standing at the side of the bed swaying & groaning with ctx. Nitrous oxide is brought into the room and set up. IV access is initiated, LR fluid bolus initiated. Between ctx, pt able to get in bed for cervical exam. As pt gets into bed, SROM occurs; moderate amount of thick meconium at 0026. Cervical exam performed, pt complete and +2 at this time. Call for MD and nursery placed. 3710: MD arrives at bedside for delivery. 1055: Pt begins pushing in the lithotomy position with legs in stirrups. With coaching, pt pushes adequately. 0531: Delivery of viable  male, cord cut,  taken to warmer with nursery nurse. 0155: Trever Sr RN at bedside to perform fundal exam. Fundus boggy upon massage and clots expressed. Tremaine Crews RN is able to palate a full bladder & sets pt on bedpan to try to void. Pt is unable to void. This RN performs straight cath for 300mL of urine. Fundus is firm only with massage. Tremaine Crews RN called to bedside to help this RN. Oxytocin bolus began; call to [facility] MD made: TORB 0.2mg of methergine IM. Fundus continues to remain firm only with massage. Call made to [facility] MD to come to bedside to assess pt. TORB for 1mg dilaudid. 2748Trang Kirkland MD performed bimanual expression. VORB for 2g Ancef x1,  573cc total   0335: RN at bedside to perform fundal check. Pt has c/o the varicose veins on the back of her legs being sore. RN assesses veins at the back of legs - not edematous - likely sore from tensing during pushing & during bimanual expression. Will continue to monitor. [id number]: RN at bedside to place pt on bedpan. Peppermint oil placed in bedpan, running water, blowing bubbles in pitcher of water. 0430: Pt removed from bedpan. Unable to void at this time. Call placed to MD to request indwelling manzanares catheter as pt is unable void.  MD agrees: TORB manzanares catheter to keep in \"until morning\". 7253: RN at bedside to place manzanares catheter. Pt tolerates placement well. 400mL of clear yellow urine drained from bladder. Fundus firm. Destiney care performed. 1: RN at bedside to perform fundal exam. 650mL of clear yellow urine emptied from manzanares catheter. Pt sat at the side of the bed with RN and OBT assistance. Destiney care performed. Pt placed in wheelchair to go upstairs. 0620: TRANSFER - OUT REPORT:    Verbal report given to JAN Nunez RN (name) on Nathaly Awan  being transferred to Dilshad Booth RN(unit) for routine progression of care       Report consisted of patients Situation, Background, Assessment and   Recommendations(SBAR). Information from the following report(s) SBAR, Kardex, Intake/Output, MAR, Accordion, Recent Results and Med Rec Status was reviewed with the receiving nurse. Lines:   Peripheral IV 05/06/18 Left Wrist (Active)   Site Assessment Clean, dry, & intact 5/6/2018 12:41 AM   Phlebitis Assessment 0 5/6/2018 12:41 AM   Infiltration Assessment 0 5/6/2018 12:41 AM   Dressing Status Clean, dry, & intact 5/6/2018 12:41 AM   Dressing Type Tape;Transparent 5/6/2018 12:41 AM   Hub Color/Line Status Pink; Infusing 5/6/2018 12:41 AM   Action Taken Blood drawn 5/6/2018 12:41 AM   Alcohol Cap Used Yes 5/6/2018 12:41 AM        Opportunity for questions and clarification was provided.       Patient transported with:   Registered Nurse

## 2018-05-06 NOTE — PROGRESS NOTES
Bedside shift change report given to Kj (oncoming nurse) by Arnulfo Bush (offgoing nurse). Report included the following information SBAR and MAR.

## 2018-05-06 NOTE — ROUTINE PROCESS
Bedside shift change report given to Savannah CHARLES (oncoming nurse) by Lauren Vasquez RN (offgoing nurse). Report included the following information SBAR, Kardex, MAR and Accordion.

## 2018-05-06 NOTE — IP AVS SNAPSHOT
[address] 
[phone] Patient: Essence Villar MRN: [id number] SMN:[id number] A check dara indicates which time of day the medication should be taken. My Medications START taking these medications Instructions Each Dose to Equal  
 Morning Noon Evening Bedtime HYDROcodone-acetaminophen 7.5-325 mg per tablet Commonly known as:  Vermonica Moscoso Your last dose was: Your next dose is: Take 1 Tab by mouth every six (6) hours as needed. Max Daily Amount: 4 Tabs. 1 Tab CONTINUE taking these medications Instructions Each Dose to Equal  
 Morning Noon Evening Bedtime CLARITIN 10 mg tablet Generic drug:  loratadine Your last dose was: Your next dose is: Take 10 mg by mouth. 10 mg  
    
   
   
   
  
 ibuprofen 800 mg tablet Commonly known as:  MOTRIN Your last dose was: Your next dose is: Take 1 Tab by mouth every eight (8) hours as needed for Pain. 800 mg  
    
   
   
   
  
 prenatal multivit-ca-min-fe-fa Tab Your last dose was: Your next dose is: Take  by mouth daily. STOP taking these medications   
 clindamycin 150 mg capsule Commonly known as:  CLEOCIN  
   
  
 MAGNESIUM PO  
   
  
 oxyCODONE-acetaminophen 5-325 mg per tablet Commonly known as:  PERCOCET  
   
  
 ROXICODONE 5 mg immediate release tablet Generic drug:  oxyCODONE IR  
   
  
 ZINC PO Where to Get Your Medications Information on where to get these meds will be given to you by the nurse or doctor. ! Ask your nurse or doctor about these medications HYDROcodone-acetaminophen 7.5-325 mg per tablet  
 ibuprofen 800 mg tablet

## 2018-05-07 PROBLEM — I82.812 ACUTE SUPERFICIAL VENOUS THROMBOSIS OF LEFT LOWER EXTREMITY: Status: ACTIVE | Noted: 2018-05-07

## 2018-05-07 PROCEDURE — 65270000029 HC RM PRIVATE

## 2018-05-07 PROCEDURE — 74011250637 HC RX REV CODE- 250/637: Performed by: OBSTETRICS & GYNECOLOGY

## 2018-05-07 PROCEDURE — 93970 EXTREMITY STUDY: CPT

## 2018-05-07 RX ADMIN — IBUPROFEN 800 MG: 800 TABLET ORAL at 22:19

## 2018-05-07 RX ADMIN — IBUPROFEN 800 MG: 800 TABLET ORAL at 14:36

## 2018-05-07 RX ADMIN — IBUPROFEN 800 MG: 800 TABLET ORAL at 06:07

## 2018-05-07 NOTE — PROGRESS NOTES
Reviewed results of LE doppler with patient and discussed options. Given superficial DVT in left calf from which she is symptomatic, options include conservative management with elevation, NSAIDS and warm compresses vs anticoagulation with Lovenox. She initially opted for anticoagulation with Lovenox in addition to doing NSAIDs which she is already taking for management of postpartum discomfort. After discussing plan options with the patient, Dr. Benny Cheung, vascular surgeon, returned my consultation phone call and recommended NSAIDs and warm compresses for treatment as, per his experience, this will resolve the majority of superficial venous thrombosis cases. I visited the patient again to discussed the consultation recommendations and she is considering both options. She would like to discuss options of 1) NSAIDS with warm compresses vs. 2) Lovenox with  and will make a decision and will let us know. Patient has been reweighed at 215 lbs for lovenox dosing if necessary. Will continue NSAIDs for now while making a decision on plan of care.     Adria Atwood MD

## 2018-05-07 NOTE — LACTATION NOTE
This note was copied from a baby's chart. Discussed with mother her plan for feeding. Reviewed the benefits of exclusive breast milk feeding during the hospital stay. Informed her of the risks of using formula to supplement in the first few days of life as well as the benefits of successful breast milk feeding; referred her to the Breastfeeding booklet about this information. She acknowledges understanding of information reviewed and states that it is her plan to breast and bottle her infant. Will support her choice and offer additional information as needed. Reviewed breastfeeding basics:  How milk is made and normal  breastfeeding behaviors discussed. Supply and demand,  stomach size, early feeding cues, skin to skin bonding with comfortable positioning and baby led latch-on reviewed. How to identify signs of successful breastfeeding sessions reviewed; education on assymetrical latch, signs of effective latching vs shallow, in-effective latching, normal  feeding frequency and duration and expected infant output discussed. Normal course of breastfeeding discussed including the AAP's recommendation that children receive exclusive breast milk feedings for the first six months of life with breast milk feedings to continue through the first year of life and/or beyond as complimentary table foods are added. Breastfeeding Booklet and Warm line information provided with discussion. Discussed typical  weight loss and the importance of pediatrician appointment within 24-48 hours of discharge, at 2 weeks of life and normalcy of requesting pediatric weight checks as needed in between visits. Biological Nurturing breastfeeding principles taught. How Biological Nurturing (BN)  promotes optimal breastfeeding (BF) sessions discussed. Mother encouraged to seek comfortable semi-reclining breastfeeding positions. Infant placed frontally along maternal contour.   Primitive innate feeding reflexes/behaviors of the  discussed. BN tips and techniques shared; assisted with comfortable breastfeeding positioning. Pt will successfully establish breastfeeding by feeding in response to early feeding cues   or wake every 3h, will obtain deep latch, and will keep log of feedings/output. Taught to BF at hunger cues and or q 2-3 hrs and to offer 10-20 drops of hand expressed colostrum at any non-feeds. Breast Assessment  Left Breast: Medium  Left Nipple: Everted, Intact, Short  Right Breast: Medium  Right Nipple: Compression stripe, Short, Tender  Breast- Feeding Assessment  Attends Breast-Feeding Classes: Yes  Breast-Feeding Experience: Yes  Breast Trauma/Surgery: Yes (reduction 11 years ago)  Type/Quality: Good  Lactation Consultant Visits  Breast-Feedings: Good   Mother/Infant Observation  Mother Observation: Alignment, Breast comfortable, Cramps  Infant Observation: Relaxed after feeding, Rhythmic suck, Feeding cues, Frenulum checked, Latches nipple and aereolae, Lips flanged, lower, Lips flanged, upper, Opens mouth (questioanble posterior tongue tie)  LATCH Documentation  Latch: Grasps breast, tongue down, lips flanged, rhythmic sucking  Audible Swallowing: Spontaneous and intermittent (24 hours old)  Type of Nipple: Everted (after stimulation)  Comfort (Breast/Nipple): Filling, red/small blisters/bruises, mild/mod discomfort  Hold (Positioning): Full assist, teach one side, mother does other, staff holds  LATCH Score: 8    Mom has a brusied R nipple. Observed baby's frenulum, possible posterior tongue tie. Baby very hyperstimulated. Had mom massage her breast and try to [facility] express drops. Mom used latch assisit and got a drop of colostrum. Placed baby in biologic postion. Baby attempting to latch but getting frustated. Placed baby in lying down, baby latched with good sucking bursts.

## 2018-05-07 NOTE — ROUTINE PROCESS
Bedside shift change report given to Savannah RN (oncoming nurse) by Haley Little RN (offgoing nurse). Report included the following information SBAR, Kardex, MAR and Recent Results.

## 2018-05-07 NOTE — PROGRESS NOTES
Bedside and Verbal shift change report given to MAIKEL Levy RN (oncoming nurse) by Millie Tamayo. Misa Orozco RN (offgoing nurse). Report included the following information SBAR, Kardex, Intake/Output and MAR.

## 2018-05-07 NOTE — PROCEDURES
[facility]  *** FINAL REPORT ***    Name: Celi Bettencourt  MRN: [id number]    Inpatient  : 1980  HIS Order #: [id number]  [address] [facility] Visit #: [id number]  Date: 07 May 2018    TYPE OF TEST: Peripheral Venous Testing    REASON FOR TEST  Pain in limb, Limb swelling    Right Leg:-  Deep venous thrombosis:           No  Superficial venous thrombosis:    No  Deep venous insufficiency:        No  Superficial venous insufficiency: No    Left Leg:-  Deep venous thrombosis:           No  Superficial venous thrombosis:    Yes  Deep venous insufficiency:        No  Superficial venous insufficiency: No      INTERPRETATION/FINDINGS  PROCEDURE:  BILATERAL LE VENOUS DUPLEX. Evaluation of lower extremity veins with ultrasound (B-mode imaging,  pulsed Doppler, color Doppler). Includes the common femoral, deep  femoral, femoral, popliteal, posterior tibial, peroneal, and great  saphenous veins. FINDINGS:  Misa Nyhan scale and color flow duplex images of the veins in  both lower extremities demonstrate normal compressibility, spontaneous   and augmented flow profiles, and absence of filling defects  throughout the deep veins in both lower extremities. A vaircose vein  in the left calf demonstrates non-compressibility with absence of flow   suggesting acute superficial thrombus formation. CONCLUSION: Acute occlusive superficial thrombophlebitis identified in   the left proximal to mid calf in a varicose vein. Bilateral lower  extremity venous duplex negative for deep venous thrombosis. ADDITIONAL COMMENTS    I have personally reviewed the data relevant to the interpretation of  this  study. TECHNOLOGIST: BENJI Townsend  Signed: 2018 11:44 AM    PHYSICIAN: Luisa Torres.  Reese Baez MD  Signed: 2018 06:42 AM

## 2018-05-07 NOTE — LACTATION NOTE
This note was copied from a baby's chart. Guidelines for pumping, milk collection and storage, proper cleaning of pump parts all reviewed. Differences between hospital grade rental pumps vs store bought double electric/hand pumps discussed. Set up pumping with double electric set up. Assisted with pump session. List of area pump rental locations and lactation support services reviewed. Showed Mom how to use pump,  Mom is going to pump since she is supplementing. Gave Mom gel pads and showed hr how to use them. Mom wants to mention tongue to Ped.  In AM.

## 2018-05-07 NOTE — PROGRESS NOTES
Post-Partum Day Number 1 Progress Note    Jena Shaffer     Assessment: Doing well, post partum day 1  Pain in left calf, h/o varicose veins and pain may be associated however cannot r/o DVT    Plan:  1. Continue routine postpartum and perineal care as well as maternal education. 2. The risks and benefits of the circumcision  procedure and anesthesia including: bleeding, infection, variability of cosmetic results were discussed at length with the mother. She is aware that future repeat procedures may be necessary. She gives informed consent to proceed as noted and her questions are answered. 3. LE dopplers today, encouraged NASRIN stockings and mobility    Information for the patient's :  Sherren Lundborg, Male [[id number]]   Vaginal, Spontaneous Delivery   Patient doing well without significant complaint. Voiding without difficulty, normal lochia. Vitals:  Visit Vitals    /86 (BP 1 Location: Right arm, BP Patient Position: At rest)    Pulse 86    Temp 98.3 °F (36.8 °C)    Resp 18    Ht 5' 9\" (1.753 m)    Wt 102.1 kg (225 lb)    SpO2 94%    Breastfeeding Unknown    BMI 33.23 kg/m2     Temp (24hrs), Av.4 °F (36.9 °C), Min:98.1 °F (36.7 °C), Max:98.8 °F (37.1 °C)        Exam:   Patient without distress. Abdomen soft, fundus firm, nontender                Perineum with normal lochia noted. Lower extremities are negative for swelling, cords or tenderness. Labs:     Lab Results   Component Value Date/Time    WBC 11.6 (H) 2018 12:45 AM    WBC 15.1 (H) 2017 02:56 PM    HGB 11.3 (L) 2018 12:45 AM    HGB 11.4 (L) 2017 02:56 PM    HCT 33.5 (L) 2018 12:45 AM    HCT 35.0 2017 02:56 PM    PLATELET 077  12:45 AM    PLATELET 274  02:56 PM       No results found for this or any previous visit (from the past 24 hour(s)).

## 2018-05-07 NOTE — PROGRESS NOTES
NASRIN stocking applied per orders, spoke to U/S tech to clarify the order. 1010 U/S tech to floor to perform bilateral lower ext. U/S per orders    1045 U/S tech advised to remove the NASRIN hose at this time    1640 Dr. Arelis Lopez to bedside to discuss U/S results and management    1800 Dr. Keturah Bernal to discussed U/S results and management also and assess pt. Teaching provided to pt and family.  Pt chooses to follow up out patient with Dr. Keturah Bernal

## 2018-05-07 NOTE — PROGRESS NOTES
In to see pt this afternoon for a social visit. She is doing well and thrilled with her  baby Nehal Shepherd. We also discussed her recent diagnosis of thromboembolism in superficial left leg vein and her treatment options. She opts for conservative management with elevation/heat/NSAIDs. Given DVT prec as well and I will see her in the office in 1 week for reevaluation. If no improvement in 1 week, then she will elect for therapeutic anticoagulation at that time.       Shauna Tucker MD

## 2018-05-08 VITALS
BODY MASS INDEX: 31.9 KG/M2 | WEIGHT: 215.4 LBS | TEMPERATURE: 98 F | SYSTOLIC BLOOD PRESSURE: 106 MMHG | DIASTOLIC BLOOD PRESSURE: 64 MMHG | HEART RATE: 75 BPM | OXYGEN SATURATION: 94 % | HEIGHT: 69 IN | RESPIRATION RATE: 16 BRPM

## 2018-05-08 PROCEDURE — 74011250637 HC RX REV CODE- 250/637: Performed by: OBSTETRICS & GYNECOLOGY

## 2018-05-08 RX ORDER — HYDROCODONE BITARTRATE AND ACETAMINOPHEN 7.5; 325 MG/1; MG/1
1 TABLET ORAL
Qty: 30 TAB | Refills: 0 | Status: SHIPPED | OUTPATIENT
Start: 2018-05-08 | End: 2020-08-31

## 2018-05-08 RX ORDER — IBUPROFEN 800 MG/1
800 TABLET ORAL
Qty: 30 TAB | Refills: 1 | Status: SHIPPED | OUTPATIENT
Start: 2018-05-08

## 2018-05-08 RX ADMIN — IBUPROFEN 800 MG: 800 TABLET ORAL at 06:14

## 2018-05-08 NOTE — DISCHARGE SUMMARY
Obstetrical Discharge Summary     Name: Anny Curran MRN: [id number]  SSN: [id number]    YOB: 1980  Age: 30 y.o. Sex: female      Admit Date: 2018    Discharge Date: 2018     Admitting Physician: Odella Merlin, MD     Attending Physician:  Vertis Ahumada, MD     Admission Diagnoses: Pregnancy  Labor, precipitous    Discharge Diagnoses:   Information for the patient's :  Nidia Wade, Male [[id number]]   Delivery of a 4.47 kg male infant via Vaginal, Spontaneous Delivery on 2018 at 12:53 AM  by . Apgars were 6 and 9. Additional Diagnoses:   Hospital Problems  Never Reviewed          Codes Class Noted POA    Acute superficial venous thrombosis of left lower extremity ICD-10-CM: I82.812  ICD-9-CM: 453.6  2018 Unknown        Labor, precipitous ICD-10-CM: O62.3  ICD-9-CM: 661.30  2018 Unknown             Lab Results   Component Value Date/Time    Rubella, External Immune 2017    GrBStrep, External Positive 2018       Hospital Course: Postpartum course was complicated by superficial leg thrombosis, which added 0 days to the patient's length of stay. Disposition at Discharge: Home or self care    Discharged Condition: Stable    Patient Instructions:   Current Discharge Medication List      CONTINUE these medications which have NOT CHANGED    Details   prenatal multivit-ca-min-fe-fa tab Take  by mouth daily. ibuprofen (MOTRIN) 800 mg tablet Take 1 Tab by mouth every eight (8) hours as needed for Pain. Qty: 30 Tab, Refills: 1      oxyCODONE-acetaminophen (PERCOCET) 5-325 mg per tablet Take 1-2 Tabs by mouth every six (6) hours as needed for Pain. Max Daily Amount: 8 Tabs. Qty: 15 Tab, Refills: 0      loratadine (CLARITIN) 10 mg tablet Take 10 mg by mouth. clindamycin (CLEOCIN) 150 mg capsule Take  by mouth every six (6) hours.  Indications: broken, infected tooth      oxyCODONE IR (ROXICODONE) 5 mg immediate release tablet Take 5 mg by mouth every six (6) hours as needed for Pain. ZINC PO Take  by mouth daily. MAGNESIUM PO Take  by mouth daily. Reference my discharge instructions. No orders of the defined types were placed in this encounter.        Signed By:  Maggie Hutchison MD     May 8, 2018

## 2018-05-08 NOTE — PROGRESS NOTES
Post-Partum Day Number 2 Progress Note    Tania Caruso     Assessment: Doing well, post partum day 2    Varicose veins with superficial thrombosis    Plan:   1. Discharge home today  2. Follow up in office in 6 weeks with Pat Mckeon MD  3. Post partum activity advised, diet as tolerated  4. Discharge Medications: ibuprofen, percocet and medications prior to admission  5. Will continue with current thrombosis management, to be reevaluated in office in 1 week, DVT/VTE precautions reviewed    Information for the patient's :  Nicholas Peña, Male [[id number]]   Vaginal, Spontaneous Delivery   Patient doing well without significant complaint. Voiding without difficulty, normal lochia. Vitals:  Visit Vitals    /64 (BP 1 Location: Right arm, BP Patient Position: At rest)    Pulse 75    Temp 98 °F (36.7 °C)    Resp 16    Ht 5' 9\" (1.753 m)    Wt 97.7 kg (215 lb 6.4 oz)    SpO2 94%    Breastfeeding Unknown    BMI 31.81 kg/m2     Temp (24hrs), Av °F (36.7 °C), Min:97.8 °F (36.6 °C), Max:98.3 °F (36.8 °C)      Exam:         Patient without distress. Abdomen soft, fundus firm, nontender                 Lower extremities are negative for swelling, cords or tenderness. Labs:     Lab Results   Component Value Date/Time    WBC 11.6 (H) 2018 12:45 AM    WBC 15.1 (H) 2017 02:56 PM    HGB 11.3 (L) 2018 12:45 AM    HGB 11.4 (L) 2017 02:56 PM    HCT 33.5 (L) 2018 12:45 AM    HCT 35.0 2017 02:56 PM    PLATELET 517  12:45 AM    PLATELET 907  02:56 PM       No results found for this or any previous visit (from the past 24 hour(s)).

## 2018-05-08 NOTE — PROGRESS NOTES
Pt off unit, in stable condition by wheel chair with FOB for discharge home per Dr Fabián Pate. Pt is to follow up in 1 weeks and is aware. Perscriptions given to patient. Patient denies any headache, dizziness, n/v, or pain at this time. Infant in care seat with mother.

## 2018-05-08 NOTE — LACTATION NOTE
This note was copied from a baby's chart. Chart shows numerous feedings, void, stool WNL. Discussed importance of monitoring outputs and feedings on first week of life. Discussed ways to tell if baby is  getting enough breast milk, ie  voids and stools, change in color of stool, and return to birth wt within 2 weeks. Follow up with pediatrician visit for weight check in 1-2 days (per AAP guidelines.)  Encouraged to call Warm Line  [phone] or [facility] at [phone] for any questions/problems that arise. Mother also given breastfeeding support group dates and times for any future needsAnticipatory guidance given. Questions answered. Discussed signs of baby's allergy, excema. Discussed engorgement management, when breast are soft and flat you are making more milk than when hard and engorged. If you should have to take a medication and MD says can't breast feed contact lactation office. Breast feed if you or the baby gets sick to pass along natural immunologic protection. Pt will successfully establish breastfeeding by feeding in response to early feeding cues   or wake every 3h, will obtain deep latch, and will keep log of feedings/output. Taught to BF at hunger cues and or q 2-3 hrs and to offer 10-20 drops of hand expressed colostrum at any non-feeds.       Breast Assessment  Left Breast: Medium  Left Nipple: Everted, Intact, Short  Right Breast: Medium  Right Nipple: Compression stripe, Short, Tender  Breast- Feeding Assessment  Attends Breast-Feeding Classes: Yes  Breast-Feeding Experience: Yes  Breast Trauma/Surgery: Yes (reduction 11 years ago)  Type/Quality: Good  Lactation Consultant Visits  Breast-Feedings: Good   Mother/Infant Observation  Mother Observation: Alignment, Breast comfortable, Cramps  Infant Observation: Relaxed after feeding, Rhythmic suck, Feeding cues, Frenulum checked, Latches nipple and aereolae, Lips flanged, lower, Lips flanged, upper, Opens mouth (questioanble posterior tongue tie)  LATCH Documentation  Latch: Grasps breast, tongue down, lips flanged, rhythmic sucking  Audible Swallowing: Spontaneous and intermittent (24 hours old)  Type of Nipple: Everted (after stimulation)  Comfort (Breast/Nipple): Filling, red/small blisters/bruises, mild/mod discomfort  Hold (Positioning): Full assist, teach one side, mother does other, staff holds  LATCH Score: 8    Mom is not going to breast feed before discharge. Mom is only going to pump. Latch score is from yesterday.

## 2018-05-08 NOTE — DISCHARGE INSTRUCTIONS
Vaginal Childbirth: After Your Visit  Your Care Instructions  Your body will slowly heal in the next few weeks. It is easy to get too tired and overwhelmed during the first weeks after your baby is born. Changes in your hormones can shift your mood without warning. You may find it hard to meet the extra demands on your energy and time. Take it easy on yourself. Follow-up care is a key part of your treatment and safety. Be sure to make and go to all appointments, and call your doctor if you are having problems. It's also a good idea to know your test results and keep a list of the medicines you take. How can you care for yourself at home? · Vaginal bleeding and cramps  ¨ After delivery, you will have a bloody discharge from the vagina. This will turn pink within a week and then white or yellow after about 10 days. It may last for 2 to 4 weeks or longer, until the uterus has healed. Use pads instead of tampons until you stop bleeding. ¨ Do not worry if you pass some blood clots, as long as they are smaller than a golf ball. If you have a tear or stitches in your vaginal area, change the pad at least every 4 hours to prevent soreness and infection. ¨ You may have cramps for the first few days after childbirth. These are normal and occur as the uterus shrinks to normal size. Take an over-the-counter pain medicine, such as acetaminophen (Tylenol), ibuprofen (Advil, Motrin), or naproxen (Aleve), for cramps. Read and follow all instructions on the label. Do not take aspirin, because it can cause more bleeding. ¨ Do not take two or more pain medicines at the same time unless the doctor told you to. Many pain medicines have acetaminophen, which is Tylenol. Too much acetaminophen (Tylenol) can be harmful. · Stitches  ¨ If you have stitches, they will dissolve on their own and do not need to be removed. Follow your doctor's instructions for cleaning the stitched area.   ¨ Put ice or a cold pack on your painful area for 10 to 20 minutes at a time, several times a day, for the first few days. Put a thin cloth between the ice and your skin. ¨ Sit in a few inches of warm water (sitz bath) 3 times a day and after bowel movements. The warm water helps with pain and itching. If you do not have a tub, a warm shower might help. · Breast fullness  ¨ Your breasts may overfill (engorge) in the first few days after delivery. To help milk flow and to relieve pain, warm your breasts in the shower or by using warm, moist towels before nursing. ¨ If you are not nursing, do not put warmth on your breasts or touch your breasts. Wear a tight bra or sports bra and use ice until the fullness goes away. This usually takes 2 to 3 days. ¨ Put ice or a cold pack on your breast after nursing to reduce swelling and pain. Put a thin cloth between the ice and your skin. · Activity  ¨ Eat a balanced diet. Do not try to lose weight by cutting calories. Keep taking your prenatal vitamins, or take a multivitamin. ¨ Get as much rest as you can. Try to take naps when your baby sleeps during the day. ¨ Get some exercise every day. But do not do any heavy exercise until your doctor says it is okay. ¨ Wait until you are healed (about 4 to 6 weeks) before you have sexual intercourse. Your doctor will tell you when it is okay to have sex. ¨ Talk to your doctor about birth control. You can get pregnant even before your period returns. Also, you can get pregnant while you are breast-feeding. · Mental health  ¨ It is normal to have some sadness, anxiety, sleeplessness, and mood swings after you go home. If you feel upset or hopeless for more than a few days or are having trouble doing the things you need to do, talk to your doctor. · Constipation and hemorrhoids  ¨ Drink plenty of fluids, enough so that your urine is light yellow or clear like water.  If you have kidney, heart, or liver disease and have to limit fluids, talk with your doctor before you increase the amount of fluids you drink. ¨ Eat plenty of fiber each day. Have a bran muffin or bran cereal for breakfast, and try eating a piece of fruit for a mid-afternoon snack. ¨ For painful, itchy hemorrhoids, put ice or a cold pack on the area several times a day for 10 minutes at a time. Follow this by putting a warm compress on the area for another 10 to 20 minutes or by sitting in a shallow, warm bath. When should you call for help? Call 911 anytime you think you may need emergency care. For example, call if:  · You are thinking of hurting yourself, your baby, or anyone else. · You have sudden, severe pain in your belly. · You passed out (lost consciousness). Call your doctor now or seek immediate medical care if:  · You have severe vaginal bleeding. · You are soaking through a pad each hour for 2 or more hours. · Your vaginal bleeding seems to be getting heavier or is still bright red 4 days after delivery. · You are dizzy or lightheaded, or you feel like you may faint. · You are vomiting or cannot keep fluids down. · You have a fever. · You have new or more belly pain. · You pass tissue (not just blood). · Your vaginal discharge smells bad. · Your belly feels tender or full and hard. · Your breasts are continuously painful or red. · You feel sad, anxious, or hopeless for more than a few days. Watch closely for changes in your health, and be sure to contact your doctor if you have any problems. Where can you learn more? Go to [employer]  Enter Q237 in the search box to learn more about \"Vaginal Childbirth: After Your Visit. \"   © 7979-4608 [employer]. Care instructions adapted under license by [employer] (which disclaims liability or warranty for this information).  This care instruction is for use with your licensed healthcare professional. If you have questions about a medical condition or this instruction, always ask your healthcare professional. [employer] disclaims any warranty or liability for your use of this information.   Content Version: 45.8.620074; Current as of: February 20, 2015

## 2018-05-08 NOTE — PROGRESS NOTES
Bedside and Verbal shift change report given to FELECIA Dhillon RN (oncoming nurse) by Millie Tamayo. Misa Orozco RN (offgoing nurse). Report included the following information SBAR, Kardex, Intake/Output and MAR.

## 2020-08-31 ENCOUNTER — OFFICE VISIT (OUTPATIENT)
Dept: OBGYN CLINIC | Age: 40
End: 2020-08-31
Payer: COMMERCIAL

## 2020-08-31 VITALS
HEIGHT: 69 IN | DIASTOLIC BLOOD PRESSURE: 76 MMHG | BODY MASS INDEX: 27.25 KG/M2 | WEIGHT: 184 LBS | SYSTOLIC BLOOD PRESSURE: 114 MMHG

## 2020-08-31 DIAGNOSIS — Z01.419 ROUTINE GYNECOLOGICAL EXAMINATION: Primary | ICD-10-CM

## 2020-08-31 DIAGNOSIS — Z12.39 BREAST SCREENING: ICD-10-CM

## 2020-08-31 PROCEDURE — 99385 PREV VISIT NEW AGE 18-39: CPT | Performed by: OBSTETRICS & GYNECOLOGY

## 2020-08-31 RX ORDER — NORETHINDRONE ACETATE AND ETHINYL ESTRADIOL, ETHINYL ESTRADIOL AND FERROUS FUMARATE 1MG-10(24)
1 KIT ORAL DAILY
Qty: 3 PACKAGE | Refills: 4 | Status: SHIPPED | OUTPATIENT
Start: 2020-08-31

## 2020-08-31 NOTE — PROGRESS NOTES
Annual exam    Gama Noe is a 47 y.o. presenting for annual exam. Her main concerns today include heavy menses and questions about whether she is perimenopausal. She states that in recent months she has been having issues with her mood prior to onset of her cycle, and also thinks her ovulation mucous has become drier and stringier than it was in the past.  Reports her mom had a hysterectomy around age 42yo. Her menstrual cycles start with 1-2 days of very heavy bleeding, during which time she feels she cannot leave the house. No significant cramping. She declines a chaperone during the gynecologic exam today. Babies Sheyla Bo and Cassandra Funes are doing well!  2 and 4yo now! Ob/Gyn Hx:    - history of 2 vaginal deliveries  LMP - about 2 weeks ago  Menses - normal interval and very regular, heavy flow in the beginning  Contraception - vasectomy  STI - declines  SA - yes,     Health maintenance:  Pap - due today  Gardasil -      Past Medical History:   Diagnosis Date    Phlebitis and thrombophlebitis        Past Surgical History:   Procedure Laterality Date    BREAST SURGERY PROCEDURE UNLISTED      HX BREAST REDUCTION      HX ORTHOPAEDIC      rods in left hand; removed    HX OTHER SURGICAL      HX WISDOM TEETH EXTRACTION         History reviewed. No pertinent family history.     Social History     Socioeconomic History    Marital status:      Spouse name: Not on file    Number of children: Not on file    Years of education: Not on file    Highest education level: Not on file   Occupational History    Not on file   Social Needs    Financial resource strain: Not on file    Food insecurity     Worry: Not on file     Inability: Not on file    Transportation needs     Medical: Not on file     Non-medical: Not on file   Tobacco Use    Smoking status: Former Smoker    Smokeless tobacco: Never Used   Substance and Sexual Activity    Alcohol use: No    Drug use: No    Sexual activity: Yes Partners: Male     Birth control/protection: None   Lifestyle    Physical activity     Days per week: Not on file     Minutes per session: Not on file    Stress: Not on file   Relationships    Social connections     Talks on phone: Not on file     Gets together: Not on file     Attends Jewish service: Not on file     Active member of club or organization: Not on file     Attends meetings of clubs or organizations: Not on file     Relationship status: Not on file    Intimate partner violence     Fear of current or ex partner: Not on file     Emotionally abused: Not on file     Physically abused: Not on file     Forced sexual activity: Not on file   Other Topics Concern    Not on file   Social History Narrative    ** Merged History Encounter **            Current Outpatient Medications   Medication Sig Dispense Refill    ibuprofen (MOTRIN) 800 mg tablet Take 1 Tab by mouth every eight (8) hours as needed for Pain. 30 Tab 1    loratadine (CLARITIN) 10 mg tablet Take 10 mg by mouth.          Allergies   Allergen Reactions    Shellfish Derived Hives     Shrimp only        Review of Systems - History obtained from the patient  Constitutional: negative for weight loss, fever, night sweats  HEENT: negative for hearing loss, earache, congestion, snoring, sorethroat  CV: negative for chest pain, palpitations, edema  Resp: negative for cough, shortness of breath, wheezing  GI: negative for change in bowel habits, abdominal pain, black or bloody stools  : negative for frequency, dysuria, hematuria, vaginal discharge  MSK: negative for back pain, joint pain, muscle pain  Breast: negative for breast lumps, nipple discharge, galactorrhea  Skin :negative for itching, rash, hives  Neuro: negative for dizziness, headache, confusion, weakness  Psych: negative for anxiety, depression, change in mood  Heme/lymph: negative for bleeding, bruising, pallor    Physical Exam    Visit Vitals  /76 (BP 1 Location: Left arm, BP Patient Position: Sitting)   Ht 5' 9\" (1.753 m)   Wt 184 lb (83.5 kg)   LMP 08/17/2020 (Approximate)   BMI 27.17 kg/m²       Constitutional  · Appearance: well-nourished, well developed, alert, in no acute distress    HENT  · Head and Face: appears normal    Neck  · Inspection/Palpation: normal appearance, no masses or tenderness  · Lymph Nodes: no lymphadenopathy present  · Thyroid: gland size normal, nontender, no nodules or masses present on palpation    Chest  · Respiratory Effort: non-labored breathing  · Auscultation: CTAB, normal breath sounds    Cardiovascular  · Heart:  · Auscultation: regular rate and rhythm without murmur  · Extremities: no peripheral edema    Breasts  · Inspection of Breasts: breasts symmetrical, no skin changes, no discharge present, nipple appearance normal, no skin retraction present  · Palpation of Breasts and Axillae: no masses present on palpation, no breast tenderness  · Axillary Lymph Nodes: no lymphadenopathy present    Gastrointestinal  · Abdominal Examination: abdomen non-tender to palpation, normal bowel sounds, no masses present  · Liver and spleen: no hepatomegaly present, spleen not palpable  · Hernias: no hernias identified    Genitourinary  · External Genitalia: normal appearance for age, no discharge present, no tenderness present, no inflammatory lesions present, no masses present, no atrophy present  · Vagina: normal vaginal vault without central or paravaginal defects, no discharge present, no inflammatory lesions present, no masses present  · Bladder: non-tender to palpation  · Urethra: appears normal  · Cervix: normal   · Uterus: normal size, shape and consistency  · Adnexa: no adnexal tenderness present, no adnexal masses present  · Perineum: perineum within normal limits, no evidence of trauma, no rashes or skin lesions present    Skin  · General Inspection: no rash, no lesions identified    Neurologic/Psychiatric  · Mental Status:  · Orientation: grossly oriented to person, place and time  · Mood and Affect: mood normal, affect appropriate      Assessment/Plan:  47 y.o. presenting for annual exam. Overall doing well. Well woman exam:  Normal gynecologic and breast exams. Healthy habits and lifestyle reviewed. Pap with HPV performed today. Patient declines STD screening. Contraception and menstrual regulation - patient opts for Lo loestrin per my recommendations. 2 sample packs and rx sent. Mammogram order placed.        Ana Maria Page MD

## 2020-08-31 NOTE — PATIENT INSTRUCTIONS
Well Visit, Ages 25 to 48: Care Instructions  Your Care Instructions     Physical exams can help you stay healthy. Your doctor has checked your overall health and may have suggested ways to take good care of yourself. He or she also may have recommended tests. At home, you can help prevent illness with healthy eating, regular exercise, and other steps. Follow-up care is a key part of your treatment and safety. Be sure to make and go to all appointments, and call your doctor if you are having problems. It's also a good idea to know your test results and keep a list of the medicines you take. How can you care for yourself at home? · Reach and stay at a healthy weight. This will lower your risk for many problems, such as obesity, diabetes, heart disease, and high blood pressure. · Get at least 30 minutes of physical activity on most days of the week. Walking is a good choice. You also may want to do other activities, such as running, swimming, cycling, or playing tennis or team sports. Discuss any changes in your exercise program with your doctor. · Do not smoke or allow others to smoke around you. If you need help quitting, talk to your doctor about stop-smoking programs and medicines. These can increase your chances of quitting for good. · Talk to your doctor about whether you have any risk factors for sexually transmitted infections (STIs). Having one sex partner (who does not have STIs and does not have sex with anyone else) is a good way to avoid these infections. · Use birth control if you do not want to have children at this time. Talk with your doctor about the choices available and what might be best for you. · Protect your skin from too much sun. When you're outdoors from 10 a.m. to 4 p.m., stay in the shade or cover up with clothing and a hat with a wide brim. Wear sunglasses that block UV rays. Even when it's cloudy, put broad-spectrum sunscreen (SPF 30 or higher) on any exposed skin.   · See a dentist one or two times a year for checkups and to have your teeth cleaned. · Wear a seat belt in the car. Follow your doctor's advice about when to have certain tests. These tests can spot problems early. For everyone  · Cholesterol. Have the fat (cholesterol) in your blood tested after age 21. Your doctor will tell you how often to have this done based on your age, family history, or other things that can increase your risk for heart disease. · Blood pressure. Have your blood pressure checked during a routine doctor visit. Your doctor will tell you how often to check your blood pressure based on your age, your blood pressure results, and other factors. · Vision. Talk with your doctor about how often to have a glaucoma test.  · Diabetes. Ask your doctor whether you should have tests for diabetes. · Colon cancer. Your risk for colorectal cancer gets higher as you get older. Some experts say that adults should start regular screening at age 48 and stop at age 76. Others say to start before age 48 or continue after age 76. Talk with your doctor about your risk and when to start and stop screening. For women  · Breast exam and mammogram. Talk to your doctor about when you should have a clinical breast exam and a mammogram. Medical experts differ on whether and how often women under 50 should have these tests. Your doctor can help you decide what is right for you. · Cervical cancer screening test and pelvic exam. Begin with a Pap test at age 24. The test often is part of a pelvic exam. Starting at age 27, you may choose to have a Pap test, an HPV test, or both tests at the same time (called co-testing). Talk with your doctor about how often to have testing. · Tests for sexually transmitted infections (STIs). Ask whether you should have tests for STIs. You may be at risk if you have sex with more than one person, especially if your partners do not wear condoms.   For men  · Tests for sexually transmitted infections (STIs). Ask whether you should have tests for STIs. You may be at risk if you have sex with more than one person, especially if you do not wear a condom. · Testicular cancer exam. Ask your doctor whether you should check your testicles regularly. · Prostate exam. Talk to your doctor about whether you should have a blood test (called a PSA test) for prostate cancer. Experts differ on whether and when men should have this test. Some experts suggest it if you are older than 39 and are -American or have a father or brother who got prostate cancer when he was younger than 72. When should you call for help? Watch closely for changes in your health, and be sure to contact your doctor if you have any problems or symptoms that concern you. Where can you learn more? Go to http://malik-yue.info/  Enter P072 in the search box to learn more about \"Well Visit, Ages 25 to 48: Care Instructions. \"  Current as of: August 22, 2019               Content Version: 12.5  © 7114-6494 [employer]. Care instructions adapted under license by [employer] (which disclaims liability or warranty for this information). If you have questions about a medical condition or this instruction, always ask your healthcare professional. [place] [address] any warranty or liability for your use of this information.

## 2020-09-08 LAB
CYTOLOGIST CVX/VAG CYTO: NORMAL
CYTOLOGY CVX/VAG DOC CYTO: NORMAL
CYTOLOGY CVX/VAG DOC THIN PREP: NORMAL
DX ICD CODE: NORMAL
HPV I/H RISK 1 DNA CVX QL PROBE+SIG AMP: NEGATIVE
Lab: NORMAL
OTHER STN SPEC: NORMAL
STAT OF ADQ CVX/VAG CYTO-IMP: NORMAL

## 2022-03-18 PROBLEM — O26.899 ABDOMINAL PAIN AFFECTING PREGNANCY: Status: ACTIVE | Noted: 2017-01-21

## 2022-03-18 PROBLEM — R10.9 ABDOMINAL PAIN AFFECTING PREGNANCY: Status: ACTIVE | Noted: 2017-01-21

## 2022-03-19 PROBLEM — Z34.90 PREGNANT: Status: ACTIVE | Noted: 2017-01-21

## 2022-03-20 PROBLEM — I82.812 ACUTE SUPERFICIAL VENOUS THROMBOSIS OF LEFT LOWER EXTREMITY: Status: ACTIVE | Noted: 2018-05-07

## 2024-08-07 ENCOUNTER — APPOINTMENT (OUTPATIENT)
Facility: HOSPITAL | Age: 44
End: 2024-08-07
Payer: COMMERCIAL

## 2024-08-07 ENCOUNTER — HOSPITAL ENCOUNTER (EMERGENCY)
Facility: HOSPITAL | Age: 44
Discharge: HOME | End: 2024-08-07
Attending: EMERGENCY MEDICINE
Payer: COMMERCIAL

## 2024-08-07 VITALS
OXYGEN SATURATION: 100 % | BODY MASS INDEX: 28.14 KG/M2 | WEIGHT: 190 LBS | RESPIRATION RATE: 17 BRPM | TEMPERATURE: 98.2 F | SYSTOLIC BLOOD PRESSURE: 122 MMHG | DIASTOLIC BLOOD PRESSURE: 86 MMHG | HEART RATE: 74 BPM | HEIGHT: 69 IN

## 2024-08-07 DIAGNOSIS — R51.9 ACUTE NONINTRACTABLE HEADACHE, UNSPECIFIED HEADACHE TYPE: Primary | ICD-10-CM

## 2024-08-07 LAB
ALBUMIN SERPL-MCNC: 3.9 G/DL (ref 3.5–5)
ALBUMIN/GLOB SERPL: 1.2 (ref 1.1–2.2)
ALP SERPL-CCNC: 60 U/L (ref 45–117)
ALT SERPL-CCNC: 62 U/L (ref 12–78)
ANION GAP SERPL CALC-SCNC: 4 MMOL/L (ref 5–15)
AST SERPL-CCNC: 33 U/L (ref 15–37)
BASOPHILS # BLD: 0.1 K/UL (ref 0–0.1)
BASOPHILS NFR BLD: 1 % (ref 0–1)
BILIRUB SERPL-MCNC: 0.4 MG/DL (ref 0.2–1)
BUN SERPL-MCNC: 17 MG/DL (ref 6–20)
BUN/CREAT SERPL: 23 (ref 12–20)
CALCIUM SERPL-MCNC: 9.1 MG/DL (ref 8.5–10.1)
CHLORIDE SERPL-SCNC: 107 MMOL/L (ref 97–108)
CO2 SERPL-SCNC: 26 MMOL/L (ref 21–32)
CREAT SERPL-MCNC: 0.73 MG/DL (ref 0.55–1.02)
DIFFERENTIAL METHOD BLD: NORMAL
EOSINOPHIL # BLD: 0.1 K/UL (ref 0–0.4)
EOSINOPHIL NFR BLD: 1 % (ref 0–7)
ERYTHROCYTE [DISTWIDTH] IN BLOOD BY AUTOMATED COUNT: 13 % (ref 11.5–14.5)
GLOBULIN SER CALC-MCNC: 3.2 G/DL (ref 2–4)
GLUCOSE SERPL-MCNC: 95 MG/DL (ref 65–100)
HCT VFR BLD AUTO: 38.5 % (ref 35–47)
HGB BLD-MCNC: 12.9 G/DL (ref 11.5–16)
IMM GRANULOCYTES # BLD AUTO: 0 K/UL (ref 0–0.04)
IMM GRANULOCYTES NFR BLD AUTO: 0 % (ref 0–0.5)
LYMPHOCYTES # BLD: 2.1 K/UL (ref 0.8–3.5)
LYMPHOCYTES NFR BLD: 30 % (ref 12–49)
MAGNESIUM SERPL-MCNC: 2.2 MG/DL (ref 1.6–2.4)
MCH RBC QN AUTO: 30.4 PG (ref 26–34)
MCHC RBC AUTO-ENTMCNC: 33.5 G/DL (ref 30–36.5)
MCV RBC AUTO: 90.6 FL (ref 80–99)
MONOCYTES # BLD: 0.6 K/UL (ref 0–1)
MONOCYTES NFR BLD: 8 % (ref 5–13)
NEUTS SEG # BLD: 4.3 K/UL (ref 1.8–8)
NEUTS SEG NFR BLD: 60 % (ref 32–75)
NRBC # BLD: 0 K/UL (ref 0–0.01)
NRBC BLD-RTO: 0 PER 100 WBC
PLATELET # BLD AUTO: 257 K/UL (ref 150–400)
PMV BLD AUTO: 9.5 FL (ref 8.9–12.9)
POTASSIUM SERPL-SCNC: 4 MMOL/L (ref 3.5–5.1)
PROT SERPL-MCNC: 7.1 G/DL (ref 6.4–8.2)
RBC # BLD AUTO: 4.25 M/UL (ref 3.8–5.2)
SODIUM SERPL-SCNC: 137 MMOL/L (ref 136–145)
WBC # BLD AUTO: 7 K/UL (ref 3.6–11)

## 2024-08-07 PROCEDURE — 96374 THER/PROPH/DIAG INJ IV PUSH: CPT

## 2024-08-07 PROCEDURE — 83735 ASSAY OF MAGNESIUM: CPT

## 2024-08-07 PROCEDURE — 6360000002 HC RX W HCPCS: Performed by: EMERGENCY MEDICINE

## 2024-08-07 PROCEDURE — 99284 EMERGENCY DEPT VISIT MOD MDM: CPT

## 2024-08-07 PROCEDURE — 70450 CT HEAD/BRAIN W/O DYE: CPT

## 2024-08-07 PROCEDURE — 2580000003 HC RX 258: Performed by: EMERGENCY MEDICINE

## 2024-08-07 PROCEDURE — 85025 COMPLETE CBC W/AUTO DIFF WBC: CPT

## 2024-08-07 PROCEDURE — 96375 TX/PRO/DX INJ NEW DRUG ADDON: CPT

## 2024-08-07 PROCEDURE — 80053 COMPREHEN METABOLIC PANEL: CPT

## 2024-08-07 PROCEDURE — 36415 COLL VENOUS BLD VENIPUNCTURE: CPT

## 2024-08-07 PROCEDURE — 96361 HYDRATE IV INFUSION ADD-ON: CPT

## 2024-08-07 PROCEDURE — 93005 ELECTROCARDIOGRAM TRACING: CPT | Performed by: STUDENT IN AN ORGANIZED HEALTH CARE EDUCATION/TRAINING PROGRAM

## 2024-08-07 RX ORDER — 0.9 % SODIUM CHLORIDE 0.9 %
1000 INTRAVENOUS SOLUTION INTRAVENOUS ONCE
Status: COMPLETED | OUTPATIENT
Start: 2024-08-07 | End: 2024-08-07

## 2024-08-07 RX ORDER — PROCHLORPERAZINE EDISYLATE 5 MG/ML
10 INJECTION INTRAMUSCULAR; INTRAVENOUS ONCE
Status: COMPLETED | OUTPATIENT
Start: 2024-08-07 | End: 2024-08-07

## 2024-08-07 RX ORDER — DIPHENHYDRAMINE HYDROCHLORIDE 50 MG/ML
12.5 INJECTION INTRAMUSCULAR; INTRAVENOUS
Status: COMPLETED | OUTPATIENT
Start: 2024-08-07 | End: 2024-08-07

## 2024-08-07 RX ADMIN — PROCHLORPERAZINE EDISYLATE 10 MG: 5 INJECTION INTRAMUSCULAR; INTRAVENOUS at 20:08

## 2024-08-07 RX ADMIN — SODIUM CHLORIDE 1000 ML: 9 INJECTION, SOLUTION INTRAVENOUS at 20:03

## 2024-08-07 RX ADMIN — DIPHENHYDRAMINE HYDROCHLORIDE 12.5 MG: 50 INJECTION INTRAMUSCULAR; INTRAVENOUS at 20:04

## 2024-08-07 ASSESSMENT — PAIN DESCRIPTION - DESCRIPTORS: DESCRIPTORS: STABBING

## 2024-08-07 ASSESSMENT — PAIN SCALES - GENERAL: PAINLEVEL_OUTOF10: 5

## 2024-08-07 ASSESSMENT — PAIN DESCRIPTION - PAIN TYPE: TYPE: ACUTE PAIN

## 2024-08-07 ASSESSMENT — PAIN - FUNCTIONAL ASSESSMENT: PAIN_FUNCTIONAL_ASSESSMENT: 0-10

## 2024-08-07 ASSESSMENT — PAIN DESCRIPTION - LOCATION: LOCATION: HEAD

## 2024-08-07 NOTE — ED PROVIDER NOTES
[facility] EMERGENCY DEPT  EMERGENCY DEPARTMENT ENCOUNTER      Pt Name: Cee Sweeney  MRN: [id number]  Birthdate 1980  Date of evaluation: 8/7/2024  Provider: Mendy Sandoval MD    CHIEF COMPLAINT       Chief Complaint   Patient presents with    Headache         HISTORY OF PRESENT ILLNESS    Cee Sweeney is a 45 yo F with headache and blurred vision.  She states that her symptoms started around noon first with headache and then shortly after with blurred vision.  The vision changes have resolved but the headache continues.  It feels like stabbing in her right frontal area.  She was vacationing with family last week that she found has not been diagnosed with viral meningitis.            Additional history from independent historians:     Review of External Medical Records:     Nursing Notes were reviewed.    REVIEW OF SYSTEMS       Review of Systems   Eyes:  Positive for visual disturbance.   Neurological:  Positive for light-headedness and headaches.       Except as noted above the remainder of the review of systems was reviewed and negative.       PAST MEDICAL HISTORY     Past Medical History:   Diagnosis Date    Phlebitis and thrombophlebitis          SURGICAL HISTORY       Past Surgical History:   Procedure Laterality Date    BREAST REDUCTION SURGERY      BREAST SURGERY      ORTHOPEDIC SURGERY      rods in left hand; removed    OTHER SURGICAL HISTORY      WISDOM TOOTH EXTRACTION           CURRENT MEDICATIONS       Previous Medications    No medications on file       ALLERGIES     Patient has no known allergies.    FAMILY HISTORY     No family history on file.       SOCIAL HISTORY       Social History     Socioeconomic History    Marital status:    Tobacco Use    Smoking status: Former    Smokeless tobacco: Never   Substance and Sexual Activity    Alcohol use: No    Drug use: No   Social History Narrative    ** Merged History Encounter **              PHYSICAL EXAM       ED Triage Vitals [08/07/24 1918]

## 2024-08-07 NOTE — ED TRIAGE NOTES
Pt amb to ED for c/o headache, dizziness, and \"weird sensation\" to right side of face that she noticed around noon.  Pt states she is concerned due to family testing positive for viral meningitis.  Pt aaox4, gcs 15, speaking in full clear sentences.

## 2024-08-08 LAB
EKG ATRIAL RATE: 69 BPM
EKG DIAGNOSIS: NORMAL
EKG P AXIS: 48 DEGREES
EKG P-R INTERVAL: 150 MS
EKG Q-T INTERVAL: 384 MS
EKG QRS DURATION: 84 MS
EKG QTC CALCULATION (BAZETT): 411 MS
EKG R AXIS: 40 DEGREES
EKG T AXIS: 42 DEGREES
EKG VENTRICULAR RATE: 69 BPM

## 2024-08-08 PROCEDURE — 93010 ELECTROCARDIOGRAM REPORT: CPT | Performed by: SPECIALIST

## 2024-08-08 NOTE — ED NOTES
Patient was given discharge paperwork. Discharge paperwork reviewed. Patient has no concerns or questions at this time.  Patient's IV line removed.